# Patient Record
Sex: FEMALE | Race: WHITE | NOT HISPANIC OR LATINO | URBAN - METROPOLITAN AREA
[De-identification: names, ages, dates, MRNs, and addresses within clinical notes are randomized per-mention and may not be internally consistent; named-entity substitution may affect disease eponyms.]

---

## 2021-08-13 ENCOUNTER — INPATIENT (INPATIENT)
Facility: HOSPITAL | Age: 61
LOS: 6 days | Discharge: NURSING, REHAB OR LONG-TERM CARE | DRG: 951 | End: 2021-08-20
Attending: STUDENT IN AN ORGANIZED HEALTH CARE EDUCATION/TRAINING PROGRAM | Admitting: STUDENT IN AN ORGANIZED HEALTH CARE EDUCATION/TRAINING PROGRAM
Payer: MEDICAID

## 2021-08-13 VITALS
HEIGHT: 67 IN | HEART RATE: 54 BPM | OXYGEN SATURATION: 93 % | WEIGHT: 160.06 LBS | RESPIRATION RATE: 18 BRPM | DIASTOLIC BLOOD PRESSURE: 76 MMHG | SYSTOLIC BLOOD PRESSURE: 117 MMHG | TEMPERATURE: 98 F

## 2021-08-13 DIAGNOSIS — G35 MULTIPLE SCLEROSIS: ICD-10-CM

## 2021-08-13 DIAGNOSIS — Z29.9 ENCOUNTER FOR PROPHYLACTIC MEASURES, UNSPECIFIED: ICD-10-CM

## 2021-08-13 DIAGNOSIS — E03.9 HYPOTHYROIDISM, UNSPECIFIED: ICD-10-CM

## 2021-08-13 DIAGNOSIS — R06.00 DYSPNEA, UNSPECIFIED: ICD-10-CM

## 2021-08-13 DIAGNOSIS — F32.9 MAJOR DEPRESSIVE DISORDER, SINGLE EPISODE, UNSPECIFIED: ICD-10-CM

## 2021-08-13 LAB
ANION GAP SERPL CALC-SCNC: 8 MMOL/L — SIGNIFICANT CHANGE UP (ref 5–17)
BASOPHILS # BLD AUTO: 0.03 K/UL — SIGNIFICANT CHANGE UP (ref 0–0.2)
BASOPHILS NFR BLD AUTO: 0.6 % — SIGNIFICANT CHANGE UP (ref 0–2)
BUN SERPL-MCNC: 12 MG/DL — SIGNIFICANT CHANGE UP (ref 7–18)
CALCIUM SERPL-MCNC: 9.6 MG/DL — SIGNIFICANT CHANGE UP (ref 8.4–10.5)
CHLORIDE SERPL-SCNC: 100 MMOL/L — SIGNIFICANT CHANGE UP (ref 96–108)
CO2 SERPL-SCNC: 27 MMOL/L — SIGNIFICANT CHANGE UP (ref 22–31)
CREAT SERPL-MCNC: 0.58 MG/DL — SIGNIFICANT CHANGE UP (ref 0.5–1.3)
EOSINOPHIL # BLD AUTO: 0.15 K/UL — SIGNIFICANT CHANGE UP (ref 0–0.5)
EOSINOPHIL NFR BLD AUTO: 2.8 % — SIGNIFICANT CHANGE UP (ref 0–6)
GLUCOSE SERPL-MCNC: 94 MG/DL — SIGNIFICANT CHANGE UP (ref 70–99)
HCT VFR BLD CALC: 38 % — SIGNIFICANT CHANGE UP (ref 34.5–45)
HGB BLD-MCNC: 12.3 G/DL — SIGNIFICANT CHANGE UP (ref 11.5–15.5)
IMM GRANULOCYTES NFR BLD AUTO: 0.4 % — SIGNIFICANT CHANGE UP (ref 0–1.5)
LYMPHOCYTES # BLD AUTO: 0.96 K/UL — LOW (ref 1–3.3)
LYMPHOCYTES # BLD AUTO: 17.9 % — SIGNIFICANT CHANGE UP (ref 13–44)
MCHC RBC-ENTMCNC: 28.2 PG — SIGNIFICANT CHANGE UP (ref 27–34)
MCHC RBC-ENTMCNC: 32.4 GM/DL — SIGNIFICANT CHANGE UP (ref 32–36)
MCV RBC AUTO: 87.2 FL — SIGNIFICANT CHANGE UP (ref 80–100)
MONOCYTES # BLD AUTO: 0.36 K/UL — SIGNIFICANT CHANGE UP (ref 0–0.9)
MONOCYTES NFR BLD AUTO: 6.7 % — SIGNIFICANT CHANGE UP (ref 2–14)
NEUTROPHILS # BLD AUTO: 3.85 K/UL — SIGNIFICANT CHANGE UP (ref 1.8–7.4)
NEUTROPHILS NFR BLD AUTO: 71.6 % — SIGNIFICANT CHANGE UP (ref 43–77)
NRBC # BLD: 0 /100 WBCS — SIGNIFICANT CHANGE UP (ref 0–0)
PLATELET # BLD AUTO: 360 K/UL — SIGNIFICANT CHANGE UP (ref 150–400)
POTASSIUM SERPL-MCNC: 4 MMOL/L — SIGNIFICANT CHANGE UP (ref 3.5–5.3)
POTASSIUM SERPL-SCNC: 4 MMOL/L — SIGNIFICANT CHANGE UP (ref 3.5–5.3)
RBC # BLD: 4.36 M/UL — SIGNIFICANT CHANGE UP (ref 3.8–5.2)
RBC # FLD: 13.2 % — SIGNIFICANT CHANGE UP (ref 10.3–14.5)
SARS-COV-2 RNA SPEC QL NAA+PROBE: SIGNIFICANT CHANGE UP
SODIUM SERPL-SCNC: 135 MMOL/L — SIGNIFICANT CHANGE UP (ref 135–145)
WBC # BLD: 5.37 K/UL — SIGNIFICANT CHANGE UP (ref 3.8–10.5)
WBC # FLD AUTO: 5.37 K/UL — SIGNIFICANT CHANGE UP (ref 3.8–10.5)

## 2021-08-13 PROCEDURE — 71045 X-RAY EXAM CHEST 1 VIEW: CPT | Mod: 26

## 2021-08-13 PROCEDURE — 99222 1ST HOSP IP/OBS MODERATE 55: CPT | Mod: GC

## 2021-08-13 PROCEDURE — 99285 EMERGENCY DEPT VISIT HI MDM: CPT

## 2021-08-13 PROCEDURE — 93010 ELECTROCARDIOGRAM REPORT: CPT

## 2021-08-13 RX ORDER — FLUTICASONE PROPIONATE 50 MCG
1 SPRAY, SUSPENSION NASAL
Qty: 0 | Refills: 0 | DISCHARGE

## 2021-08-13 RX ORDER — CARBAMAZEPINE 200 MG
1 TABLET ORAL
Qty: 0 | Refills: 0 | DISCHARGE

## 2021-08-13 RX ORDER — BUPROPION HYDROCHLORIDE 150 MG/1
150 TABLET, EXTENDED RELEASE ORAL ONCE
Refills: 0 | Status: COMPLETED | OUTPATIENT
Start: 2021-08-13 | End: 2021-08-13

## 2021-08-13 RX ORDER — DALFAMPRIDINE 10 MG/1
1 TABLET, FILM COATED, EXTENDED RELEASE ORAL
Qty: 0 | Refills: 0 | DISCHARGE

## 2021-08-13 RX ORDER — ASPIRIN/CALCIUM CARB/MAGNESIUM 324 MG
1 TABLET ORAL
Qty: 0 | Refills: 0 | DISCHARGE

## 2021-08-13 RX ORDER — ONDANSETRON 8 MG/1
4 TABLET, FILM COATED ORAL EVERY 8 HOURS
Refills: 0 | Status: DISCONTINUED | OUTPATIENT
Start: 2021-08-13 | End: 2021-08-20

## 2021-08-13 RX ORDER — CARBAMAZEPINE 200 MG
200 TABLET ORAL
Refills: 0 | Status: DISCONTINUED | OUTPATIENT
Start: 2021-08-13 | End: 2021-08-13

## 2021-08-13 RX ORDER — BACLOFEN 100 %
1 POWDER (GRAM) MISCELLANEOUS
Qty: 0 | Refills: 0 | DISCHARGE

## 2021-08-13 RX ORDER — SIMVASTATIN 20 MG/1
1 TABLET, FILM COATED ORAL
Qty: 0 | Refills: 0 | DISCHARGE

## 2021-08-13 RX ORDER — LEVOTHYROXINE SODIUM 125 MCG
1 TABLET ORAL
Qty: 0 | Refills: 0 | DISCHARGE

## 2021-08-13 RX ORDER — FAMOTIDINE 10 MG/ML
1 INJECTION INTRAVENOUS
Qty: 0 | Refills: 0 | DISCHARGE

## 2021-08-13 RX ORDER — TRAMADOL HYDROCHLORIDE 50 MG/1
50 TABLET ORAL EVERY 8 HOURS
Refills: 0 | Status: DISCONTINUED | OUTPATIENT
Start: 2021-08-13 | End: 2021-08-18

## 2021-08-13 RX ORDER — LEVOTHYROXINE SODIUM 125 MCG
100 TABLET ORAL ONCE
Refills: 0 | Status: COMPLETED | OUTPATIENT
Start: 2021-08-13 | End: 2021-08-13

## 2021-08-13 RX ORDER — ENOXAPARIN SODIUM 100 MG/ML
40 INJECTION SUBCUTANEOUS DAILY
Refills: 0 | Status: DISCONTINUED | OUTPATIENT
Start: 2021-08-13 | End: 2021-08-20

## 2021-08-13 RX ORDER — FAMOTIDINE 10 MG/ML
20 INJECTION INTRAVENOUS ONCE
Refills: 0 | Status: COMPLETED | OUTPATIENT
Start: 2021-08-13 | End: 2021-08-13

## 2021-08-13 RX ORDER — BUPROPION HYDROCHLORIDE 150 MG/1
1 TABLET, EXTENDED RELEASE ORAL
Qty: 0 | Refills: 0 | DISCHARGE

## 2021-08-13 RX ORDER — ACETAMINOPHEN 500 MG
1 TABLET ORAL
Qty: 0 | Refills: 0 | DISCHARGE

## 2021-08-13 RX ORDER — ACETAMINOPHEN 500 MG
650 TABLET ORAL ONCE
Refills: 0 | Status: COMPLETED | OUTPATIENT
Start: 2021-08-13 | End: 2021-08-13

## 2021-08-13 RX ORDER — FAMOTIDINE 10 MG/ML
20 INJECTION INTRAVENOUS
Refills: 0 | Status: DISCONTINUED | OUTPATIENT
Start: 2021-08-13 | End: 2021-08-20

## 2021-08-13 RX ORDER — TRAMADOL HYDROCHLORIDE 50 MG/1
150 TABLET ORAL DAILY
Refills: 0 | Status: DISCONTINUED | OUTPATIENT
Start: 2021-08-13 | End: 2021-08-13

## 2021-08-13 RX ORDER — ACETAMINOPHEN 500 MG
325 TABLET ORAL EVERY 8 HOURS
Refills: 0 | Status: DISCONTINUED | OUTPATIENT
Start: 2021-08-13 | End: 2021-08-20

## 2021-08-13 RX ORDER — ASPIRIN/CALCIUM CARB/MAGNESIUM 324 MG
81 TABLET ORAL DAILY
Refills: 0 | Status: DISCONTINUED | OUTPATIENT
Start: 2021-08-14 | End: 2021-08-20

## 2021-08-13 RX ORDER — BUPROPION HYDROCHLORIDE 150 MG/1
150 TABLET, EXTENDED RELEASE ORAL DAILY
Refills: 0 | Status: DISCONTINUED | OUTPATIENT
Start: 2021-08-14 | End: 2021-08-20

## 2021-08-13 RX ORDER — LEVOTHYROXINE SODIUM 125 MCG
100 TABLET ORAL DAILY
Refills: 0 | Status: DISCONTINUED | OUTPATIENT
Start: 2021-08-14 | End: 2021-08-20

## 2021-08-13 RX ORDER — BACLOFEN 100 %
20 POWDER (GRAM) MISCELLANEOUS ONCE
Refills: 0 | Status: COMPLETED | OUTPATIENT
Start: 2021-08-13 | End: 2021-08-13

## 2021-08-13 RX ORDER — CARBAMAZEPINE 200 MG
200 TABLET ORAL ONCE
Refills: 0 | Status: COMPLETED | OUTPATIENT
Start: 2021-08-13 | End: 2021-08-13

## 2021-08-13 RX ORDER — ONDANSETRON 8 MG/1
1 TABLET, FILM COATED ORAL
Qty: 0 | Refills: 0 | DISCHARGE

## 2021-08-13 RX ORDER — SIMVASTATIN 20 MG/1
20 TABLET, FILM COATED ORAL AT BEDTIME
Refills: 0 | Status: DISCONTINUED | OUTPATIENT
Start: 2021-08-13 | End: 2021-08-20

## 2021-08-13 RX ORDER — BACLOFEN 100 %
20 POWDER (GRAM) MISCELLANEOUS THREE TIMES A DAY
Refills: 0 | Status: DISCONTINUED | OUTPATIENT
Start: 2021-08-13 | End: 2021-08-20

## 2021-08-13 RX ORDER — ASPIRIN/CALCIUM CARB/MAGNESIUM 324 MG
81 TABLET ORAL ONCE
Refills: 0 | Status: COMPLETED | OUTPATIENT
Start: 2021-08-13 | End: 2021-08-13

## 2021-08-13 RX ORDER — CARBAMAZEPINE 200 MG
200 TABLET ORAL
Refills: 0 | Status: DISCONTINUED | OUTPATIENT
Start: 2021-08-13 | End: 2021-08-20

## 2021-08-13 RX ORDER — TRAMADOL HYDROCHLORIDE 50 MG/1
1 TABLET ORAL
Qty: 0 | Refills: 0 | DISCHARGE

## 2021-08-13 RX ADMIN — Medication 650 MILLIGRAM(S): at 11:18

## 2021-08-13 RX ADMIN — Medication 20 MILLIGRAM(S): at 22:11

## 2021-08-13 RX ADMIN — BUPROPION HYDROCHLORIDE 150 MILLIGRAM(S): 150 TABLET, EXTENDED RELEASE ORAL at 11:19

## 2021-08-13 RX ADMIN — SIMVASTATIN 20 MILLIGRAM(S): 20 TABLET, FILM COATED ORAL at 22:12

## 2021-08-13 RX ADMIN — ENOXAPARIN SODIUM 40 MILLIGRAM(S): 100 INJECTION SUBCUTANEOUS at 13:10

## 2021-08-13 RX ADMIN — Medication 650 MILLIGRAM(S): at 11:44

## 2021-08-13 RX ADMIN — Medication 325 MILLIGRAM(S): at 22:11

## 2021-08-13 RX ADMIN — Medication 20 MILLIGRAM(S): at 11:19

## 2021-08-13 RX ADMIN — Medication 325 MILLIGRAM(S): at 22:41

## 2021-08-13 RX ADMIN — Medication 200 MILLIGRAM(S): at 19:03

## 2021-08-13 RX ADMIN — FAMOTIDINE 20 MILLIGRAM(S): 10 INJECTION INTRAVENOUS at 11:18

## 2021-08-13 RX ADMIN — Medication 20 MILLIGRAM(S): at 15:46

## 2021-08-13 RX ADMIN — Medication 200 MILLIGRAM(S): at 11:19

## 2021-08-13 RX ADMIN — FAMOTIDINE 20 MILLIGRAM(S): 10 INJECTION INTRAVENOUS at 19:04

## 2021-08-13 RX ADMIN — Medication 81 MILLIGRAM(S): at 11:18

## 2021-08-13 RX ADMIN — Medication 100 MICROGRAM(S): at 11:18

## 2021-08-13 NOTE — PHYSICAL THERAPY INITIAL EVALUATION ADULT - GENERAL OBSERVATIONS, REHAB EVAL
Patient received in supine, Axox3, presents w/ B/L foot drop, weakness to both UE and LE; (+) access on LUE.

## 2021-08-13 NOTE — ED PROVIDER NOTE - PROGRESS NOTE DETAILS
Spoke with Ms. Estrada from [place]. States air conditioning in patient's room broke. Now repaired and requesting to have patient returned to the facility. Patient states she refuses to return to [place] because "it's a nightmare." Has been in [place] for less than 24 hours. Before that, she was at [facility] for treatment of a UTI and was sent to [place] for rehab. Normally lives at home with  and son. Has 67 hours of HHA per week. Will contact case management. Medical director of [facility] called, patient declined to speak to him. Case management unable to arrange placement for today. Will admit. Endorsed to Dr. Mckay. Patient is resting comfortably, NAD. Asymptomatic.

## 2021-08-13 NOTE — H&P ADULT - PROBLEM SELECTOR PLAN 1
Pt called 911 due to feeling short of breath due to faulty air conditioning at [facility] yesterday, says she is sensitive to temperature secondary to MS  - Pt called 911 due to feeling short of breath due to faulty air conditioning at [facility] yesterday, says she is sensitive to temperature secondary to MS  -Case management working with patient for safe discharge planning Pt called 911 due to feeling short of breath due to faulty air conditioning at [facility] yesterday, says she is sensitive to temperature secondary to MS  -Case management working with patient for safe discharge planning  -Pt takes Dalfampridine ER 10 mg twice daily, Proanthocyanidins 36 mg x once daily  Also takes Tramadol PRN and Tylenol PRN for pain  C/w home medication  Pt has her home medications  F/u PT consult, pt uses a wheelchair to ambulate due to progressive worsening in ambulation 2/2 MS

## 2021-08-13 NOTE — PHYSICAL THERAPY INITIAL EVALUATION ADULT - CRITERIA FOR SKILLED THERAPEUTIC INTERVENTIONS
impairments found/functional limitations in following categories/risk reduction/prevention/rehab potential/predicted duration of therapy intervention

## 2021-08-13 NOTE — ED PROVIDER NOTE - OBJECTIVE STATEMENT
Patient states her room was very hot overnight, and she felt short of breath. No fever, cp, ap, n/v/d. Patient states her room was very hot overnight, and she felt short of breath. No fever, cp, ap, n/v/d. Patient called 911 herself because she said NH staff would not call.

## 2021-08-13 NOTE — PHYSICAL THERAPY INITIAL EVALUATION ADULT - ADDITIONAL COMMENTS
Patient states has WC and RW; has bedside commode at home. States had PT and HHA services.
Patient/Caregiver provided printed discharge information.

## 2021-08-13 NOTE — ED ADULT NURSE NOTE - NSIMPLEMENTINTERV_GEN_ALL_ED
Implemented All Fall Risk Interventions:  [place] to call system. Call bell, personal items and telephone within reach. Instruct patient to call for assistance. Room bathroom lighting operational. Non-slip footwear when patient is off stretcher. Physically safe environment: no spills, clutter or unnecessary equipment. Stretcher in lowest position, wheels locked, appropriate side rails in place. Provide visual cue, wrist band, yellow gown, etc. Monitor gait and stability. Monitor for mental status changes and reorient to person, place, and time. Review medications for side effects contributing to fall risk. Reinforce activity limits and safety measures with patient and family.

## 2021-08-13 NOTE — PHYSICAL THERAPY INITIAL EVALUATION ADULT - PERTINENT HX OF CURRENT PROBLEM, REHAB EVAL
Patient admitted from home due to SOB; patient w/ h/o Progressive MS, Hypothyroidism, Major Depression.

## 2021-08-13 NOTE — ED ADULT TRIAGE NOTE - CHIEF COMPLAINT QUOTE
Patient states " I feel short of breath. The room where I am at is hot and I can't stand hot and cold because I have multiple sclerosis "

## 2021-08-13 NOTE — ED PROVIDER NOTE - CLINICAL SUMMARY MEDICAL DECISION MAKING FREE TEXT BOX
Patient with shortness of breath that resolved, possibly due to hot environment. Will check labs, EKG, CXR. Now needs placement in new rehab facility.

## 2021-08-13 NOTE — H&P ADULT - ATTENDING COMMENTS
Patient seen and examined on bedside.    Vital Signs Last 24 Hrs  T(C): 36.6 (13 Aug 2021 06:55), Max: 36.6 (13 Aug 2021 06:55)  T(F): 97.8 (13 Aug 2021 06:55), Max: 97.8 (13 Aug 2021 06:55)  HR: 54 (13 Aug 2021 06:55) (54 - 54)  BP: 117/76 (13 Aug 2021 06:55) (117/76 - 117/76)  BP(mean): --  RR: 18 (13 Aug 2021 06:55) (18 - 18)  SpO2: 93% (13 Aug 2021 06:55) (93% - 93%)    Labs and imaging studies noted.    Assessment and plan: Patient seen and examined on bedside.    Vital Signs Last 24 Hrs  T(C): 36.6 (13 Aug 2021 06:55), Max: 36.6 (13 Aug 2021 06:55)  T(F): 97.8 (13 Aug 2021 06:55), Max: 97.8 (13 Aug 2021 06:55)  HR: 54 (13 Aug 2021 06:55) (54 - 54)  BP: 117/76 (13 Aug 2021 06:55) (117/76 - 117/76)  BP(mean): --  RR: 18 (13 Aug 2021 06:55) (18 - 18)  SpO2: 93% (13 Aug 2021 06:55) (93% - 93%)    Labs and imaging studies noted.    Assessment and plan:    60 y/o Female, ambulates with wheelchair, with medical hx significant for secondary to Progressive Multiple Sclerosis, Hypothyroidism, Major Depression, presenting to the ED due to shortness of breath. Pt says that she was discharged from [facility] yesterday, was admitted for UTI and discharged to [facility] for Rehab. Patient seen and examined on bedside.    Vital Signs Last 24 Hrs  T(C): 36.6 (13 Aug 2021 06:55), Max: 36.6 (13 Aug 2021 06:55)  T(F): 97.8 (13 Aug 2021 06:55), Max: 97.8 (13 Aug 2021 06:55)  HR: 54 (13 Aug 2021 06:55) (54 - 54)  BP: 117/76 (13 Aug 2021 06:55) (117/76 - 117/76)  BP(mean): --  RR: 18 (13 Aug 2021 06:55) (18 - 18)  SpO2: 93% (13 Aug 2021 06:55) (93% - 93%)    Labs and imaging studies noted.    Assessment and plan:    62 y/o Female, ambulates with wheelchair, with medical hx significant for secondary to Progressive Multiple Sclerosis, Hypothyroidism, Major Depression, presenting to the ED due to shortness of breath. Pt says that she was discharged from [facility] yesterday, was admitted for UTI and discharged to [facility] for Rehab. Patient essentially stayed at [facility] for 1 day and called 911 as she was feeling warm and AC conditioning not working causing her shortness of breath. Patient comfortable NAD, CXR clear. Patient reports that she do not want to go back to [facility] and would like to discuss other options for rehab. Patient reports no dysuria, has already completed course of antibiotics for UTI.    Progressive Multiple Sclerosis  Hypothyroidism.  Major Depression,    Plan:  Case management consult  PT eval  continue home medications. Patient seen and examined on bedside.    Vital Signs Last 24 Hrs  T(C): 36.6 (13 Aug 2021 06:55), Max: 36.6 (13 Aug 2021 06:55)  T(F): 97.8 (13 Aug 2021 06:55), Max: 97.8 (13 Aug 2021 06:55)  HR: 54 (13 Aug 2021 06:55) (54 - 54)  BP: 117/76 (13 Aug 2021 06:55) (117/76 - 117/76)  BP(mean): --  RR: 18 (13 Aug 2021 06:55) (18 - 18)  SpO2: 93% (13 Aug 2021 06:55) (93% - 93%)    Labs and imaging studies noted.    Assessment and plan:    60 y/o Female, ambulates with wheelchair, with medical hx significant for secondary to Progressive Multiple Sclerosis, Hypothyroidism, Major Depression, presenting to the ED due to shortness of breath. Pt says that she was discharged from [facility] yesterday, was admitted for UTI and discharged to [facility] for Rehab. Patient essentially stayed at [facility] for 1 day and called 911 as she was feeling warm and AC conditioning not working causing her shortness of breath. Patient comfortable NAD, CXR clear. Patient reports that she do not want to go back to [facility] and would like to discuss other options for rehab. Patient reports no dysuria, has already completed course of antibiotics for UTI.  Patient admitted for safe discharge planning.    Progressive Multiple Sclerosis  Hypothyroidism.  Major Depression  Discharge planning issues.    Plan:  Case management consult  PT eval  continue home medications.

## 2021-08-13 NOTE — H&P ADULT - PROBLEM SELECTOR PLAN 2
Pt has hx of Multiple Sclerosis, takes Dalfampridine ER 10 mg twice daily, Proanthocyanidins 36 mg x 1 day  C/w home medication  Pt has her home medications Pt has hx of Multiple Sclerosis, takes Dalfampridine ER 10 mg twice daily, Proanthocyanidins 36 mg x 1 day  Also takes Tramadol PRN and Tylenol PRN for pain  C/w home medication  Pt has her home medications  F/u PT consult, pt uses a wheelchair to ambulate due to progressive worsening in ambulation 2/2 MS Pt has hx of Levothyroxine 100 mg   C/w home medication

## 2021-08-13 NOTE — H&P ADULT - NSHPPHYSICALEXAM_GEN_ALL_CORE
PHYSICAL EXAM:  GENERAL: Elderly female resting in bed, upright and comfortable  HEAD:  Atraumatic, Normocephalic  EYES: EOMI, PERRLA, conjunctiva and sclera clear  NECK: Supple, No JVD  CHEST/LUNG: Clear to auscultation bilaterally; No wheeze  HEART: Regular rate and rhythm; S1, S2+  ABDOMEN: Soft, Nontender, Nondistended; Bowel sounds present  EXTREMITIES: No clubbing, cyanosis, or edema  NEUROLOGY: AAOx3, 3/5 pt minimally resists to gravity  SKIN: No rashes or lesions

## 2021-08-13 NOTE — H&P ADULT - NSHPOUTPATIENTPROVIDERS_GEN_ALL_CORE
Fredrick Chapman MD -[phone], [facility] and [facility]

## 2021-08-13 NOTE — ED ADULT NURSE NOTE - OBJECTIVE STATEMENT
pt is here for shortness of breath.  pt stated that "I cannot breath", sending from 24 hours nursing home, finish her talking without difficulty, h/x MS, a/ox3, denied chest pain, denied fever or chills, no distress noted at this time.

## 2021-08-13 NOTE — H&P ADULT - ASSESSMENT
62 y/o Female, ambulates with wheelchair, with medical hx significant for Secondary Progressive Multiple Sclerosis, Hypothyroidism, Major Depression, presenting to the ED due to shortness of breath 62 y/o Female, ambulates with wheelchair, with medical hx significant for Secondary Progressive Multiple Sclerosis, Hypothyroidism, Major Depression, presenting to the ED due to shortness of breath which has resolved upon hospital admission, admitted for Safe discharge.

## 2021-08-13 NOTE — H&P ADULT - PROBLEM SELECTOR PLAN 3
Pt has hx of Levothyroxine 100 mg   C/w home medication Pt has hx of Depression, takes Wellbutrin XL daily  C/w home medication

## 2021-08-13 NOTE — H&P ADULT - HISTORY OF PRESENT ILLNESS
Patient is a 62 y/o Female, ambulates with wheelchair, with medical hx significant for Secondary Progressive Multiple Sclerosis, Hypothyroidism, Major Depression, presenting to the ED due to shortness of breath. Pt says that she was discharged from [facility] yesterday, was admitted for UTI and discharged to [facility] for Rehab. She endorses that she is very sensitive to temperature changes due to underlying MS and yesterday, the air conditioning was not working which caused her to feel short of breathe, fatigued, nauseous and had a headache hence she called 911 and brought all her belongings with the EMR. Pt currently very particular that she would not like to return to [place], would like to be placed elsewhere upon discharge.   Pt has residual weakness bilateral lower extremities for quite some time    Patient is a 60 y/o Female, ambulates with wheelchair, with medical hx significant for Secondary Progressive Multiple Sclerosis, Hypothyroidism, Major Depression, presenting to the ED due to shortness of breath. Pt says that she was discharged from [facility] yesterday, was admitted for UTI and discharged to [facility] for Rehab. She endorses that she is very sensitive to temperature changes due to underlying MS and yesterday, the air conditioning was not working which caused her to feel short of breathe, fatigued, nauseous and had a headache hence she called 911 and brought all her belongings with the EMR. Pt currently very particular that she would not like to return to [place], would like to be placed elsewhere upon discharge.   Pt has residual weakness bilateral lower extremities ever since she was diagnosed with MS. 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

## 2021-08-14 PROCEDURE — 99232 SBSQ HOSP IP/OBS MODERATE 35: CPT

## 2021-08-14 RX ORDER — SODIUM CHLORIDE 9 MG/ML
500 INJECTION INTRAMUSCULAR; INTRAVENOUS; SUBCUTANEOUS ONCE
Refills: 0 | Status: COMPLETED | OUTPATIENT
Start: 2021-08-14 | End: 2021-08-14

## 2021-08-14 RX ADMIN — Medication 81 MILLIGRAM(S): at 11:10

## 2021-08-14 RX ADMIN — Medication 200 MILLIGRAM(S): at 17:38

## 2021-08-14 RX ADMIN — Medication 100 MICROGRAM(S): at 05:07

## 2021-08-14 RX ADMIN — Medication 200 MILLIGRAM(S): at 06:29

## 2021-08-14 RX ADMIN — BUPROPION HYDROCHLORIDE 150 MILLIGRAM(S): 150 TABLET, EXTENDED RELEASE ORAL at 11:10

## 2021-08-14 RX ADMIN — SIMVASTATIN 20 MILLIGRAM(S): 20 TABLET, FILM COATED ORAL at 22:32

## 2021-08-14 RX ADMIN — FAMOTIDINE 20 MILLIGRAM(S): 10 INJECTION INTRAVENOUS at 17:39

## 2021-08-14 RX ADMIN — Medication 20 MILLIGRAM(S): at 05:06

## 2021-08-14 RX ADMIN — ENOXAPARIN SODIUM 40 MILLIGRAM(S): 100 INJECTION SUBCUTANEOUS at 11:10

## 2021-08-14 RX ADMIN — Medication 20 MILLIGRAM(S): at 22:32

## 2021-08-14 RX ADMIN — Medication 20 MILLIGRAM(S): at 17:39

## 2021-08-14 RX ADMIN — FAMOTIDINE 20 MILLIGRAM(S): 10 INJECTION INTRAVENOUS at 05:06

## 2021-08-14 NOTE — CONSULT NOTE ADULT - SUBJECTIVE AND OBJECTIVE BOX
Time of visit:    CHIEF COMPLAINT: Patient is a 61y old  Female who presents with a chief complaint of Shortness of breath (14 Aug 2021 11:55)      HPI:  Patient is a 60 y/o Female, ambulates with wheelchair, with medical hx significant for Secondary Progressive Multiple Sclerosis, Hypothyroidism, Major Depression, presenting to the ED due to shortness of breath. Pt says that she was discharged from [facility] yesterday, was admitted for UTI and discharged to [facility] for Rehab. She endorses that she is very sensitive to temperature changes due to underlying MS and yesterday, the air conditioning was not working which caused her to feel short of breathe, fatigued, nauseous and had a headache hence she called 911 and brought all her belongings with the EMR. Pt currently very particular that she would not like to return to [place], would like to be placed elsewhere upon discharge.   Pt has residual weakness bilateral lower extremities ever since she was diagnosed with MS. 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   (13 Aug 2021 11:29)   Patient seen and examined.     PAST MEDICAL & SURGICAL HISTORY:  Multiple sclerosis    Hypothyroidism        Allergies    Bactrim (Short breath)  Vicodin (Short breath; Rash)    Intolerances        MEDICATIONS  (STANDING):  aspirin enteric coated 81 milliGRAM(s) Oral daily  baclofen 20 milliGRAM(s) Oral three times a day  buPROPion XL (24-Hour) . 150 milliGRAM(s) Oral daily  carBAMazepine ER Tablet 200 milliGRAM(s) Oral two times a day  enoxaparin Injectable 40 milliGRAM(s) SubCutaneous daily  famotidine    Tablet 20 milliGRAM(s) Oral two times a day  levothyroxine 100 MICROGram(s) Oral daily  simvastatin 20 milliGRAM(s) Oral at bedtime  traMADol 50 milliGRAM(s) Oral every 8 hours      MEDICATIONS  (PRN):  acetaminophen   Tablet .. 325 milliGRAM(s) Oral every 8 hours PRN Mild Pain (1 - 3)  ondansetron    Tablet 4 milliGRAM(s) Oral every 8 hours PRN Nausea and/or Vomiting   Medications up to date at time of exam.    Medications up to date at time of exam.    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    CARDIOVASCULAR:  denies pressure, squeezing, tightness, or heaviness about the chest; no palpitations.    RESPIRATORY:  denies SOB, cough, NOE, wheezing.    GASTROINTESTINAL:  denies abdominal pain, nausea, vomiting or diarrhea.    GENITOURINARY: denies dysuria, frequency or urgency.    NEUROLOGIC:  denies numbness, tingling, seizures or weakness.    PSYCHIATRIC:  denies disorder of thought or mood.    MSK: denies swelling, redness      PHYSICAL EXAMINATION:    GENERAL: The patient is a well-developed, well-nourished, in no apparent distress.     Vital Signs Last 24 Hrs  T(C): 36.9 (14 Aug 2021 12:56), Max: 36.9 (14 Aug 2021 12:56)  T(F): 98.5 (14 Aug 2021 12:56), Max: 98.5 (14 Aug 2021 12:56)  HR: 73 (14 Aug 2021 14:34) (58 - 73)  BP: 109/70 (14 Aug 2021 14:34) (97/65 - 118/73)  BP(mean): --  RR: 16 (14 Aug 2021 12:56) (16 - 18)  SpO2: 99% (14 Aug 2021 14:34) (95% - 99%)   (if applicable)    Chest Tube (if applicable)    HEENT: Head is normocephalic and atraumatic. Extraocular muscles are intact. Mucous membranes are moist.     NECK: Supple, no palpable adenopathy.    LUNGS: Clear to auscultation, no wheezing, rales, or rhonchi.    HEART: Regular rate and rhythm without murmur.    ABDOMEN: Soft, nontender, and nondistended.  No hepatosplenomegaly is noted.    RENAL: No difficulty voiding, no pelvic pain    EXTREMITIES: Without any cyanosis, clubbing, rash, lesions or edema.    NEUROLOGIC: Awake, alert, oriented, grossly intact  b/l lower weakness 2/5    SKIN: Warm, dry, good turgor.      LABS:                        12.3   5.37  )-----------( 360      ( 13 Aug 2021 08:37 )             38.0     08-13    135  |  100  |  12  ----------------------------<  94  4.0   |  27  |  0.58    Ca    9.6      13 Aug 2021 08:37                          MICROBIOLOGY: (if applicable)    RADIOLOGY & ADDITIONAL STUDIES:  EKG:   CXR:< from: Xray Chest 1 View- PORTABLE-Urgent (08.13.21 @ 08:53) >    EXAM:  XR CHEST PORTABLE URGENT 1V                            PROCEDURE DATE:  08/13/2021          INTERPRETATION:  AP erect chest on August 13, 2021 at 8:48 AM. Patient is short of breath.    COMPARISON: None available.    Heart size is within normal limits.    The lung fields and pleural surfaces are unremarkable.    IMPRESSION: Negative chest.    --- End of Report ---            MICHEL JOHNSON MD; Attending Radiologist  This document has been electronically signed. Aug 13 2021 11:37AM    < end of copied text >    ECHO:    IMPRESSION: 61y Female PAST MEDICAL & SURGICAL HISTORY:  Multiple sclerosis    Hypothyroidism     p/w           IMP: This is a 61 yr old white woman , ambulates with wheelchair, with medical hx significant for Secondary Progressive Multiple Sclerosis, Hypothyroidism, Major Depression, presenting to the ED due to shortness of breath. Pt says that she was discharged from [facility] yesterday, was admitted for UTI and discharged to [facility] for Rehab. She endorses that she is very sensitive to temperature changes due to underlying MS and yesterday, the air conditioning was not working which caused her to feel short of breathe, fatigued, nauseous and had a headache hence she called 911 and brought all her belongings with the EMR. Pt currently very particular that she would not like to return to [place], would like to be placed elsewhere upon discharge.   Pat is speaking with out difficulty , SOB resolved at time of examination       [place]ugg:  - continue meds   - PT   - asp precaution   - if pat becomes SOB/ resp distress , check VC   - DVT/ GI prophy

## 2021-08-14 NOTE — PROGRESS NOTE ADULT - SUBJECTIVE AND OBJECTIVE BOX
HPI:  Patient is a 60 y/o Female, ambulates with wheelchair, with medical hx significant for Secondary Progressive Multiple Sclerosis, Hypothyroidism, Major Depression, presenting to the ED due to shortness of breath. Pt says that she was discharged from [facility] yesterday, was admitted for UTI and discharged to [facility] for Rehab. She endorses that she is very sensitive to temperature changes due to underlying MS and yesterday, the air conditioning was not working which caused her to feel short of breathe, fatigued, nauseous and had a headache hence she called 911 and brought all her belongings with the EMR. Pt currently very particular that she would not like to return to [place], would like to be placed elsewhere upon discharge.   Pt has residual weakness bilateral lower extremities ever since she was diagnosed with MS. 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   (13 Aug 2021 11:29)      Patient is a 61y old  Female who presents with a chief complaint of Shortness of breath (13 Aug 2021 11:29)      INTERVAL HPI/OVERNIGHT EVENTS:  T(C): 36.4 (08-14-21 @ 05:00), Max: 36.7 (08-13-21 @ 16:12)  HR: 68 (08-14-21 @ 11:16) (54 - 68)  BP: 118/73 (08-14-21 @ 11:16) (97/65 - 118/73)  RR: 17 (08-14-21 @ 05:00) (17 - 18)  SpO2: 95% (08-14-21 @ 05:00) (95% - 97%)  Wt(kg): --  I&O's Summary    13 Aug 2021 07:01  -  14 Aug 2021 07:00  --------------------------------------------------------  IN: 0 mL / OUT: 350 mL / NET: -350 mL        REVIEW OF SYSTEMS: denies fever, chills, SOB, palpitations, chest pain, abdominal pain, nausea, vomitting, diarrhea, constipation, dizziness    MEDICATIONS  (STANDING):  aspirin enteric coated 81 milliGRAM(s) Oral daily  baclofen 20 milliGRAM(s) Oral three times a day  buPROPion XL (24-Hour) . 150 milliGRAM(s) Oral daily  carBAMazepine ER Tablet 200 milliGRAM(s) Oral two times a day  enoxaparin Injectable 40 milliGRAM(s) SubCutaneous daily  famotidine    Tablet 20 milliGRAM(s) Oral two times a day  levothyroxine 100 MICROGram(s) Oral daily  simvastatin 20 milliGRAM(s) Oral at bedtime  traMADol 50 milliGRAM(s) Oral every 8 hours    MEDICATIONS  (PRN):  acetaminophen   Tablet .. 325 milliGRAM(s) Oral every 8 hours PRN Mild Pain (1 - 3)  ondansetron    Tablet 4 milliGRAM(s) Oral every 8 hours PRN Nausea and/or Vomiting      PHYSICAL EXAM:  GENERAL: NAD, well-groomed, well-developed  HEAD:  Atraumatic, Normocephalic  EYES: EOMI, PERRLA, conjunctiva and sclera clear  ENMT: No tonsillar erythema, exudates, or enlargement; Moist mucous membranes, Good dentition, No lesions  NECK: Supple, No JVD, Normal thyroid  NERVOUS SYSTEM:  Alert & Oriented X3, Good concentration; Motor Strength 5/5 B/L upper and lower extremities; DTRs 2+ intact and symmetric  CHEST/LUNG: Clear to percussion bilaterally; No rales, rhonchi, wheezing, or rubs  HEART: Regular rate and rhythm; No murmurs, rubs, or gallops  ABDOMEN: Soft, Nontender, Nondistended; Bowel sounds present  EXTREMITIES:  2+ Peripheral Pulses, No clubbing, cyanosis, or edema  LYMPH: No lymphadenopathy noted  SKIN: No rashes or lesions  LABS:                        12.3   5.37  )-----------( 360      ( 13 Aug 2021 08:37 )             38.0     08-13    135  |  100  |  12  ----------------------------<  94  4.0   |  27  |  0.58    Ca    9.6      13 Aug 2021 08:37          CAPILLARY BLOOD GLUCOSE             HPI:  Patient is a 62 y/o Female, ambulates with wheelchair, with medical hx significant for Secondary Progressive Multiple Sclerosis, Hypothyroidism, Major Depression, presenting to the ED due to shortness of breath. Pt says that she was discharged from [facility] yesterday, was admitted for UTI and discharged to [facility] for Rehab. She endorses that she is very sensitive to temperature changes due to underlying MS and yesterday, the air conditioning was not working which caused her to feel short of breathe, fatigued, nauseous and had a headache hence she called 911 and brought all her belongings with the EMR. Pt currently very particular that she would not like to return to [place], would like to be placed elsewhere upon discharge.   Pt has residual weakness bilateral lower extremities ever since she was diagnosed with MS. 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   (13 Aug 2021 11:29)      Patient is a 61y old  Female who presents with a chief complaint of Shortness of breath (13 Aug 2021 11:29)      INTERVAL HPI/OVERNIGHT EVENTS: No  new events overnight   Patient working with PT.    T(C): 36.4 (08-14-21 @ 05:00), Max: 36.7 (08-13-21 @ 16:12)  HR: 68 (08-14-21 @ 11:16) (54 - 68)  BP: 118/73 (08-14-21 @ 11:16) (97/65 - 118/73)  RR: 17 (08-14-21 @ 05:00) (17 - 18)  SpO2: 95% (08-14-21 @ 05:00) (95% - 97%)  Wt(kg): --  I&O's Summary    13 Aug 2021 07:01  -  14 Aug 2021 07:00  --------------------------------------------------------  IN: 0 mL / OUT: 350 mL / NET: -350 mL        REVIEW OF SYSTEMS: denies fever, chills, SOB, palpitations, chest pain, abdominal pain, nausea, vomiting, diarrhea, constipation, dizziness    MEDICATIONS  (STANDING):  aspirin enteric coated 81 milliGRAM(s) Oral daily  baclofen 20 milliGRAM(s) Oral three times a day  buPROPion XL (24-Hour) . 150 milliGRAM(s) Oral daily  carBAMazepine ER Tablet 200 milliGRAM(s) Oral two times a day  enoxaparin Injectable 40 milliGRAM(s) SubCutaneous daily  famotidine    Tablet 20 milliGRAM(s) Oral two times a day  levothyroxine 100 MICROGram(s) Oral daily  simvastatin 20 milliGRAM(s) Oral at bedtime  traMADol 50 milliGRAM(s) Oral every 8 hours    MEDICATIONS  (PRN):  acetaminophen   Tablet .. 325 milliGRAM(s) Oral every 8 hours PRN Mild Pain (1 - 3)  ondansetron    Tablet 4 milliGRAM(s) Oral every 8 hours PRN Nausea and/or Vomiting      PHYSICAL EXAM:  GENERAL: NAD, well-groomed, well-developed  HEAD:  Atraumatic, Normocephalic  EYES: EOMI, PERRLA, conjunctiva and sclera clear  ENMT: No tonsillar erythema, exudates, or enlargement; Moist mucous membranes, Good dentition, No lesions  NECK: Supple, No JVD, Normal thyroid  NERVOUS SYSTEM:  Alert & Oriented X3, Good concentration; Motor Strength 5/5 B/L upper and lower extremities; DTRs 2+ intact and symmetric  CHEST/LUNG: Clear to percussion bilaterally; No rales, rhonchi, wheezing, or rubs  HEART: Regular rate and rhythm; No murmurs, rubs, or gallops  ABDOMEN: Soft, Nontender, Nondistended; Bowel sounds present  EXTREMITIES:  2+ Peripheral Pulses, No clubbing, cyanosis, or edema  LYMPH: No lymphadenopathy noted  SKIN: No rashes or lesions  LABS:                        12.3   5.37  )-----------( 360      ( 13 Aug 2021 08:37 )             38.0     08-13    135  |  100  |  12  ----------------------------<  94  4.0   |  27  |  0.58    Ca    9.6      13 Aug 2021 08:37          CAPILLARY BLOOD GLUCOSE

## 2021-08-15 PROCEDURE — 99232 SBSQ HOSP IP/OBS MODERATE 35: CPT

## 2021-08-15 RX ADMIN — Medication 200 MILLIGRAM(S): at 17:35

## 2021-08-15 RX ADMIN — Medication 81 MILLIGRAM(S): at 12:13

## 2021-08-15 RX ADMIN — FAMOTIDINE 20 MILLIGRAM(S): 10 INJECTION INTRAVENOUS at 05:31

## 2021-08-15 RX ADMIN — TRAMADOL HYDROCHLORIDE 50 MILLIGRAM(S): 50 TABLET ORAL at 15:10

## 2021-08-15 RX ADMIN — BUPROPION HYDROCHLORIDE 150 MILLIGRAM(S): 150 TABLET, EXTENDED RELEASE ORAL at 12:13

## 2021-08-15 RX ADMIN — Medication 325 MILLIGRAM(S): at 11:38

## 2021-08-15 RX ADMIN — Medication 20 MILLIGRAM(S): at 14:10

## 2021-08-15 RX ADMIN — SIMVASTATIN 20 MILLIGRAM(S): 20 TABLET, FILM COATED ORAL at 22:49

## 2021-08-15 RX ADMIN — Medication 100 MICROGRAM(S): at 05:31

## 2021-08-15 RX ADMIN — Medication 325 MILLIGRAM(S): at 10:28

## 2021-08-15 RX ADMIN — FAMOTIDINE 20 MILLIGRAM(S): 10 INJECTION INTRAVENOUS at 17:36

## 2021-08-15 RX ADMIN — Medication 20 MILLIGRAM(S): at 05:31

## 2021-08-15 RX ADMIN — Medication 20 MILLIGRAM(S): at 22:49

## 2021-08-15 RX ADMIN — ENOXAPARIN SODIUM 40 MILLIGRAM(S): 100 INJECTION SUBCUTANEOUS at 12:13

## 2021-08-15 RX ADMIN — TRAMADOL HYDROCHLORIDE 50 MILLIGRAM(S): 50 TABLET ORAL at 14:10

## 2021-08-15 RX ADMIN — Medication 200 MILLIGRAM(S): at 05:30

## 2021-08-15 NOTE — PROGRESS NOTE ADULT - SUBJECTIVE AND OBJECTIVE BOX
Time of Visit:  Patient seen and examined.     MEDICATIONS  (STANDING):  aspirin enteric coated 81 milliGRAM(s) Oral daily  baclofen 20 milliGRAM(s) Oral three times a day  buPROPion XL (24-Hour) . 150 milliGRAM(s) Oral daily  carBAMazepine ER Tablet 200 milliGRAM(s) Oral two times a day  enoxaparin Injectable 40 milliGRAM(s) SubCutaneous daily  famotidine    Tablet 20 milliGRAM(s) Oral two times a day  levothyroxine 100 MICROGram(s) Oral daily  simvastatin 20 milliGRAM(s) Oral at bedtime  traMADol 50 milliGRAM(s) Oral every 8 hours      MEDICATIONS  (PRN):  acetaminophen   Tablet .. 325 milliGRAM(s) Oral every 8 hours PRN Mild Pain (1 - 3)  ondansetron    Tablet 4 milliGRAM(s) Oral every 8 hours PRN Nausea and/or Vomiting       Medications up to date at time of exam.      PHYSICAL EXAMINATION:  Patient has no new complaints.  GENERAL: The patient is a well-developed, well-nourished, in no apparent distress.     Vital Signs Last 24 Hrs  T(C): 36.6 (15 Aug 2021 13:07), Max: 36.7 (14 Aug 2021 20:48)  T(F): 97.8 (15 Aug 2021 13:07), Max: 98 (14 Aug 2021 20:48)  HR: 64 (15 Aug 2021 13:07) (60 - 71)  BP: 100/68 (15 Aug 2021 13:07) (93/61 - 100/68)  BP(mean): --  RR: 16 (15 Aug 2021 13:07) (16 - 18)  SpO2: 98% (15 Aug 2021 13:07) (95% - 98%)   (if applicable)    Chest Tube (if applicable)    HEENT: Head is normocephalic and atraumatic. Extraocular muscles are intact. Mucous membranes are moist.     NECK: Supple, no palpable adenopathy.    LUNGS: Clear to auscultation, no wheezing, rales, or rhonchi.    HEART: Regular rate and rhythm without murmur.    ABDOMEN: Soft, nontender, and nondistended.  No hepatosplenomegaly is noted.    : No painful voiding, no pelvic pain    EXTREMITIES: Without any cyanosis, clubbing, rash, lesions or edema.    NEUROLOGIC: Awake, alert, oriented, grossly intact    SKIN: Warm, dry, good turgor.      LABS:                              MICROBIOLOGY: (if applicable)    RADIOLOGY & ADDITIONAL STUDIES:  EKG:   CXR:  ECHO:    IMPRESSION: 61y Female PAST MEDICAL & SURGICAL HISTORY:  Multiple sclerosis    Hypothyroidism     p/w           IMP: This is a 61 yr old white woman , ambulates with wheelchair, with medical hx significant for Secondary Progressive Multiple Sclerosis, Hypothyroidism, Major Depression, presenting to the ED due to shortness of breath. Pt says that she was discharged from [facility] yesterday, was admitted for UTI and discharged to [facility] for Rehab. She endorses that she is very sensitive to temperature changes due to underlying MS and yesterday, the air conditioning was not working which caused her to feel short of breathe, fatigued, nauseous and had a headache hence she called 911 and brought all her belongings with the EMR. Pt currently very particular that she would not like to return to [place], would like to be placed elsewhere upon discharge.   Pat is speaking with out difficulty , SOB resolved at time of examination       Sugg:  - continue meds   - PT   - asp precaution   - if pat becomes SOB/ resp distress , check VC   - DVT/ GI prophy

## 2021-08-15 NOTE — PROGRESS NOTE ADULT - SUBJECTIVE AND OBJECTIVE BOX
HPI:  Patient is a 62 y/o Female, ambulates with wheelchair, with medical hx significant for Secondary Progressive Multiple Sclerosis, Hypothyroidism, Major Depression, presenting to the ED due to shortness of breath. Pt says that she was discharged from [facility] yesterday, was admitted for UTI and discharged to [facility] for Rehab. She endorses that she is very sensitive to temperature changes due to underlying MS and yesterday, the air conditioning was not working which caused her to feel short of breathe, fatigued, nauseous and had a headache hence she called 911 and brought all her belongings with the EMR. Pt currently very particular that she would not like to return to [place], would like to be placed elsewhere upon discharge.   Pt has residual weakness bilateral lower extremities ever since she was diagnosed with MS. 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).   (13 Aug 2021 11:29)      Patient is a 61y old  Female who presents with a chief complaint of Shortness of breath (15 Aug 2021 15:45)      INTERVAL HPI/OVERNIGHT EVENTS:  T(C): 36.6 (08-15-21 @ 13:07), Max: 36.7 (08-14-21 @ 20:48)  HR: 64 (08-15-21 @ 13:07) (60 - 71)  BP: 100/68 (08-15-21 @ 13:07) (93/61 - 100/68)  RR: 16 (08-15-21 @ 13:07) (16 - 18)  SpO2: 98% (08-15-21 @ 13:07) (95% - 98%)  Wt(kg): --  I&O's Summary    14 Aug 2021 07:01  -  15 Aug 2021 07:00  --------------------------------------------------------  IN: 0 mL / OUT: 1850 mL / NET: -1850 mL    15 Aug 2021 07:01  -  15 Aug 2021 15:59  --------------------------------------------------------  IN: 0 mL / OUT: 500 mL / NET: -500 mL        REVIEW OF SYSTEMS: denies fever, chills, SOB, palpitations, chest pain, abdominal pain, nausea, vomitting, diarrhea, constipation, dizziness    MEDICATIONS  (STANDING):  aspirin enteric coated 81 milliGRAM(s) Oral daily  baclofen 20 milliGRAM(s) Oral three times a day  buPROPion XL (24-Hour) . 150 milliGRAM(s) Oral daily  carBAMazepine ER Tablet 200 milliGRAM(s) Oral two times a day  enoxaparin Injectable 40 milliGRAM(s) SubCutaneous daily  famotidine    Tablet 20 milliGRAM(s) Oral two times a day  levothyroxine 100 MICROGram(s) Oral daily  simvastatin 20 milliGRAM(s) Oral at bedtime  traMADol 50 milliGRAM(s) Oral every 8 hours    MEDICATIONS  (PRN):  acetaminophen   Tablet .. 325 milliGRAM(s) Oral every 8 hours PRN Mild Pain (1 - 3)  ondansetron    Tablet 4 milliGRAM(s) Oral every 8 hours PRN Nausea and/or Vomiting      PHYSICAL EXAM:  GENERAL: NAD, well-groomed, well-developed  HEAD:  Atraumatic, Normocephalic  EYES: EOMI, PERRLA, conjunctiva and sclera clear  ENMT: No tonsillar erythema, exudates, or enlargement; Moist mucous membranes  NECK: Supple, No JVD, Normal thyroid  NERVOUS SYSTEM:  Alert & Oriented X3, Good concentration; no neurological deficit  CHEST/LUNG: Clear to percussion bilaterally; No rales, rhonchi, wheezing, or rubs  HEART: Regular rate and rhythm; No murmurs, rubs, or gallops  ABDOMEN: Soft, Nontender, Nondistended; Bowel sounds present  EXTREMITIES:  2+ Peripheral Pulses, No clubbing, cyanosis, or edema  LYMPH: No lymphadenopathy noted  SKIN: No rashes or lesions  LABS:              CAPILLARY BLOOD GLUCOSE

## 2021-08-16 DIAGNOSIS — Z02.9 ENCOUNTER FOR ADMINISTRATIVE EXAMINATIONS, UNSPECIFIED: ICD-10-CM

## 2021-08-16 PROCEDURE — 99232 SBSQ HOSP IP/OBS MODERATE 35: CPT | Mod: GC

## 2021-08-16 RX ADMIN — SIMVASTATIN 20 MILLIGRAM(S): 20 TABLET, FILM COATED ORAL at 21:21

## 2021-08-16 RX ADMIN — BUPROPION HYDROCHLORIDE 150 MILLIGRAM(S): 150 TABLET, EXTENDED RELEASE ORAL at 13:33

## 2021-08-16 RX ADMIN — FAMOTIDINE 20 MILLIGRAM(S): 10 INJECTION INTRAVENOUS at 05:46

## 2021-08-16 RX ADMIN — FAMOTIDINE 20 MILLIGRAM(S): 10 INJECTION INTRAVENOUS at 18:08

## 2021-08-16 RX ADMIN — Medication 20 MILLIGRAM(S): at 05:46

## 2021-08-16 RX ADMIN — Medication 20 MILLIGRAM(S): at 13:34

## 2021-08-16 RX ADMIN — Medication 100 MICROGRAM(S): at 05:46

## 2021-08-16 RX ADMIN — ENOXAPARIN SODIUM 40 MILLIGRAM(S): 100 INJECTION SUBCUTANEOUS at 13:33

## 2021-08-16 RX ADMIN — Medication 20 MILLIGRAM(S): at 21:21

## 2021-08-16 RX ADMIN — Medication 200 MILLIGRAM(S): at 18:08

## 2021-08-16 RX ADMIN — Medication 81 MILLIGRAM(S): at 13:33

## 2021-08-16 RX ADMIN — Medication 200 MILLIGRAM(S): at 05:46

## 2021-08-16 RX ADMIN — Medication 325 MILLIGRAM(S): at 21:51

## 2021-08-16 RX ADMIN — Medication 325 MILLIGRAM(S): at 21:21

## 2021-08-16 NOTE — PROGRESS NOTE ADULT - SUBJECTIVE AND OBJECTIVE BOX
Time of Visit:  Patient seen and examined.     MEDICATIONS  (STANDING):  aspirin enteric coated 81 milliGRAM(s) Oral daily  baclofen 20 milliGRAM(s) Oral three times a day  buPROPion XL (24-Hour) . 150 milliGRAM(s) Oral daily  carBAMazepine ER Tablet 200 milliGRAM(s) Oral two times a day  enoxaparin Injectable 40 milliGRAM(s) SubCutaneous daily  famotidine    Tablet 20 milliGRAM(s) Oral two times a day  levothyroxine 100 MICROGram(s) Oral daily  simvastatin 20 milliGRAM(s) Oral at bedtime  traMADol 50 milliGRAM(s) Oral every 8 hours      MEDICATIONS  (PRN):  acetaminophen   Tablet .. 325 milliGRAM(s) Oral every 8 hours PRN Mild Pain (1 - 3)  ondansetron    Tablet 4 milliGRAM(s) Oral every 8 hours PRN Nausea and/or Vomiting       Medications up to date at time of exam.    ROS; No fever, chills, cough, congestion.   PHYSICAL EXAMINATION:    Vital Signs Last 24 Hrs  T(C): 36.2 (16 Aug 2021 05:25), Max: 36.6 (15 Aug 2021 20:30)  T(F): 97.2 (16 Aug 2021 05:25), Max: 97.8 (15 Aug 2021 20:30)  HR: 57 (16 Aug 2021 05:25) (57 - 64)  BP: 100/64 (16 Aug 2021 05:25) (100/62 - 100/64)  BP(mean): --  RR: 17 (16 Aug 2021 05:25) (17 - 18)  SpO2: 96% (16 Aug 2021 05:25) (95% - 96%)   (if applicable)    General; Alert and oriented. Able to answer question with no SOB. No acute distress.     HEENT: Head is normocephalic and atraumatic. No nasal tenderness. Extraocular muscles are intact. Mucous membranes are moist.     NECK: Supple, no palpable adenopathy.    LUNGS: Clear to auscultation bilaterally with no wheezing, rales, or rhonchi. No use of accessory muscle.     HEART: S1 S2 Regular rate and no click/ rub.     ABDOMEN: Soft, nontender, and nondistended.  No hepatosplenomegaly is noted. Active bowel sounds.     EXTREMITIES: Without any cyanosis, clubbing, rash, lesions or edema.    NEUROLOGIC: Awake, alert, oriented.     SKIN: Warm and moist. Non diaphoretic.       LABS:    MICROBIOLOGY: (if applicable)    RADIOLOGY & ADDITIONAL STUDIES:  EKG:   CXR:  ECHO:    IMPRESSION: 61y Female PAST MEDICAL & SURGICAL HISTORY:  Multiple sclerosis    Hypothyroidism    Impression: 62 Y/O Female presented to ED with shortness of breath. Pt says that she was discharged from [facility] yesterday, was admitted for UTI and discharged to [facility] for Rehab. She endorses that she is very sensitive to temperature changes due to underlying MS and yesterday, the air conditioning was not working which caused her to feel short of breathe, fatigued, nauseous and had a headache hence she called 911 and brought all her belongings with the EMR. 08-13-21 Negative for Covid 19 PCR. SOB resolved and saturating good room air on exam.       Suggestion :  O2 saturation 96% room air. So far saturating good room air on exam.   Pulmonary oral hygiene care.  DVT/ GI prophylactic.

## 2021-08-16 NOTE — PROGRESS NOTE ADULT - PROBLEM SELECTOR PLAN 1
Pt called 911 due to feeling short of breath due to faulty air conditioning at [facility] yesterday, says she is sensitive to temperature secondary to MS  -Case management working with patient for safe discharge planning  -Pt takes Dalfampridine ER 10 mg twice daily, Proanthocyanidins 36 mg x once daily  Also takes Tramadol PRN and Tylenol PRN for pain  C/w home medication  Pt has her home medications  F/u PT consult, pt uses a wheelchair to ambulate due to progressive worsening in ambulation 2/2 MS Patient recently discharged from [facility] for a UTI s/p completed abx course, sent to [facility].  UTI symptoms have since resolved, no leukocytosis on admission   Patient requests not to be sent to [facility] for [facility], requesting be sent to [facility] in [place]  CM is following   F/u COVID results

## 2021-08-16 NOTE — PROGRESS NOTE ADULT - PROBLEM SELECTOR PLAN 2
Pt has hx of Levothyroxine 100 mg   C/w home medication Pt called 911 due to feeling short of breath due to faulty air conditioning at [facility] yesterday, says she is sensitive to temperature secondary to MS  -Case management working with patient for safe discharge planning  -Pt takes Dalfampridine ER 10 mg twice daily, Proanthocyanidins 36 mg x once daily  Also takes Tramadol PRN and Tylenol PRN for pain  C/w home medication  Pt has her home medications  PT consult, pt uses a wheelchair to ambulate due to progressive worsening in ambulation 2/2 MS  PT recs [facility]

## 2021-08-16 NOTE — PROGRESS NOTE ADULT - SUBJECTIVE AND OBJECTIVE BOX
PGY-1 Progress Note discussed with attending    PLEASE CONTACT ON CALL TEAM:  - On Call Team (Please refer to Sophie) FROM 5:00 PM - 8:30PM  - Nightfloat Team FROM 8:30 -7:30 AM    CHIEF COMPLAINT & BRIEF HOSPITAL COURSE:  Patient is a 60 y/o Female, ambulates with wheelchair, with medical hx significant for Secondary Progressive Multiple Sclerosis, Hypothyroidism, Major Depression, presenting to the ED due to shortness of breath. Pt says that she was discharged from [facility] yesterday, was admitted for UTI and discharged to [facility] for Rehab. She endorses that she is very sensitive to temperature changes due to underlying MS and yesterday, the air conditioning was not working which caused her to feel short of breathe, fatigued, nauseous and had a headache hence she called 911 and brought all her belongings with the EMR. Pt currently very particular that she would not like to return to [place], would like to be placed elsewhere upon discharge.   Pt has residual weakness bilateral lower extremities ever since she was diagnosed with MS. 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 (13 Aug 2021 11:29)      INTERVAL HPI/OVERNIGHT EVENTS:   No over night events to report. Patient found resting comfortably and has no new complaints. Patient's only request is to not go back to [facility], prefers [facility] in [place]. Patient has no new complaints, denies any change in urinary symptoms s/p completed abx course    REVIEW OF SYSTEMS:  CONSTITUTIONAL: No fever, weight loss, or fatigue  RESPIRATORY: No cough, wheezing, chills or hemoptysis; No shortness of breath  CARDIOVASCULAR: No chest pain, palpitations, dizziness, or leg swelling  GASTROINTESTINAL: No abdominal pain. No nausea, vomiting, or hematemesis; No diarrhea or constipation. No melena or hematochezia.  GENITOURINARY: No dysuria or hematuria, urinary frequency  NEUROLOGICAL: No headaches, memory loss, loss of strength, numbness, or tremors  SKIN: No itching, burning, rashes, or lesions     Vital Signs Last 24 Hrs  T(C): 36.9 (16 Aug 2021 13:29), Max: 36.9 (16 Aug 2021 13:29)  T(F): 98.4 (16 Aug 2021 13:29), Max: 98.4 (16 Aug 2021 13:29)  HR: 64 (16 Aug 2021 14:10) (57 - 64)  BP: 102/68 (16 Aug 2021 14:10) (99/66 - 102/68)  BP(mean): --  RR: 16 (16 Aug 2021 13:29) (16 - 18)  SpO2: 97% (16 Aug 2021 14:10) (95% - 97%)    PHYSICAL EXAMINATION:  GENERAL: NAD, well built  HEAD:  Atraumatic, Normocephalic  EYES:  conjunctiva and sclera clear  NECK: Supple, No JVD, Normal thyroid  CHEST/LUNG: Clear to auscultation. Clear to percussion bilaterally; No rales, rhonchi, wheezing, or rubs  HEART: Regular rate and rhythm; No murmurs, rubs, or gallops  ABDOMEN: Soft, Nontender, Nondistended; Bowel sounds present  NERVOUS SYSTEM:  Alert & Oriented X3,    EXTREMITIES:  2+ Peripheral Pulses, No clubbing, cyanosis, or edema  SKIN: warm dry

## 2021-08-16 NOTE — PROGRESS NOTE ADULT - PROBLEM SELECTOR PLAN 3
Pt has hx of Depression, takes Wellbutrin XL daily  C/w home medication Pt has hx of Levothyroxine 100 mg   C/w home medication

## 2021-08-17 LAB — SARS-COV-2 RNA SPEC QL NAA+PROBE: SIGNIFICANT CHANGE UP

## 2021-08-17 PROCEDURE — 99232 SBSQ HOSP IP/OBS MODERATE 35: CPT | Mod: GC

## 2021-08-17 RX ADMIN — SIMVASTATIN 20 MILLIGRAM(S): 20 TABLET, FILM COATED ORAL at 21:50

## 2021-08-17 RX ADMIN — Medication 20 MILLIGRAM(S): at 21:50

## 2021-08-17 RX ADMIN — Medication 325 MILLIGRAM(S): at 22:21

## 2021-08-17 RX ADMIN — BUPROPION HYDROCHLORIDE 150 MILLIGRAM(S): 150 TABLET, EXTENDED RELEASE ORAL at 12:34

## 2021-08-17 RX ADMIN — Medication 200 MILLIGRAM(S): at 05:22

## 2021-08-17 RX ADMIN — Medication 20 MILLIGRAM(S): at 18:15

## 2021-08-17 RX ADMIN — Medication 325 MILLIGRAM(S): at 21:51

## 2021-08-17 RX ADMIN — Medication 100 MICROGRAM(S): at 05:22

## 2021-08-17 RX ADMIN — Medication 200 MILLIGRAM(S): at 18:13

## 2021-08-17 RX ADMIN — Medication 20 MILLIGRAM(S): at 05:22

## 2021-08-17 RX ADMIN — ENOXAPARIN SODIUM 40 MILLIGRAM(S): 100 INJECTION SUBCUTANEOUS at 12:34

## 2021-08-17 RX ADMIN — FAMOTIDINE 20 MILLIGRAM(S): 10 INJECTION INTRAVENOUS at 05:22

## 2021-08-17 RX ADMIN — Medication 81 MILLIGRAM(S): at 12:34

## 2021-08-17 RX ADMIN — FAMOTIDINE 20 MILLIGRAM(S): 10 INJECTION INTRAVENOUS at 18:13

## 2021-08-17 NOTE — DISCHARGE NOTE PROVIDER - NSDCMRMEDTOKEN_GEN_ALL_CORE_FT
Aspirin Enteric Coated 81 mg oral delayed release tablet: 1 tab(s) orally once a day  baclofen 20 mg oral tablet: 1 tab(s) orally 3 times a day  carBAMazepine 200 mg oral tablet: 1 tab(s) orally 2 times a day  dalfampridine 10 mg oral tablet, extended release: 1 tab(s) orally every 12 hours  famotidine 20 mg oral tablet: 1 tab(s) orally 2 times a day  fluticasone 27.5 mcg/inh nasal spray: 1 spray(s) nasal once a day, As Needed  levothyroxine 100 mcg (0.1 mg) oral tablet: 1 tab(s) orally once a day  simvastatin 20 mg oral tablet: 1 tab(s) orally once a day (at bedtime)  traMADol 150 mg/24 hours oral capsule, extended release: 1 tab(s) orally once a day, As Needed  Tylenol 325 mg oral capsule: 1 cap(s) orally 3 times a day, As Needed  Wellbutrin  mg/24 hours oral tablet, extended release: 1 tab(s) orally every 24 hours  Zofran 4 mg oral tablet: 1 tab(s) orally every 8 hours, As Needed

## 2021-08-17 NOTE — PROGRESS NOTE ADULT - PROBLEM SELECTOR PLAN 2
Pt called 911 due to feeling short of breath due to faulty air conditioning at [facility] yesterday, says she is sensitive to temperature secondary to MS  -Case management working with patient for safe discharge planning  -Pt takes Dalfampridine ER 10 mg twice daily, Proanthocyanidins 36 mg x once daily  Also takes Tramadol PRN and Tylenol PRN for pain  C/w home medication  Pt has her home medications  PT consult, pt uses a wheelchair to ambulate due to progressive worsening in ambulation 2/2 MS  PT recs [facility]ITH[facility]

## 2021-08-17 NOTE — DISCHARGE NOTE PROVIDER - NSDCCPCAREPLAN_GEN_ALL_CORE_FT
PRINCIPAL DISCHARGE DIAGNOSIS  Diagnosis: Multiple sclerosis  Assessment and Plan of Treatment: You came to the hospital because you were feeling short of breath and increased sensitivity to high temperatures. On admission, your vitals and examintaion were unremarkable. Your labwork was normal. We continued your home medications of Dalfampridine ER 10 mg twice daily, Proanthocyanidins 36 mg x once daily. Kindly follow up with your PCP in a week.      SECONDARY DISCHARGE DIAGNOSES  Diagnosis: Hypothyroidism  Assessment and Plan of Treatment: You have history of hypothyroidism. We continued your home medication of levothyroxine.     PRINCIPAL DISCHARGE DIAGNOSIS  Diagnosis: Multiple sclerosis  Assessment and Plan of Treatment: You came to the hospital because you were feeling short of breath and increased sensitivity to high temperatures. On admission, your vitals and examintaion were unremarkable. Your labwork was normal. We continued your home medications of Dalfampridine ER 10 mg twice daily, Proanthocyanidins 36 mg x once daily. You will be discharged to [facility]. Kindly follow up with your primary care physician in a week.      SECONDARY DISCHARGE DIAGNOSES  Diagnosis: Hypothyroidism  Assessment and Plan of Treatment: You have history of hypothyroidism. We continued your home medication of levothyroxine.

## 2021-08-17 NOTE — PROGRESS NOTE ADULT - SUBJECTIVE AND OBJECTIVE BOX
Time of Visit:  Patient seen and examined.     MEDICATIONS  (STANDING):  aspirin enteric coated 81 milliGRAM(s) Oral daily  baclofen 20 milliGRAM(s) Oral three times a day  buPROPion XL (24-Hour) . 150 milliGRAM(s) Oral daily  carBAMazepine ER Tablet 200 milliGRAM(s) Oral two times a day  enoxaparin Injectable 40 milliGRAM(s) SubCutaneous daily  famotidine    Tablet 20 milliGRAM(s) Oral two times a day  levothyroxine 100 MICROGram(s) Oral daily  simvastatin 20 milliGRAM(s) Oral at bedtime  traMADol 50 milliGRAM(s) Oral every 8 hours      MEDICATIONS  (PRN):  acetaminophen   Tablet .. 325 milliGRAM(s) Oral every 8 hours PRN Mild Pain (1 - 3)  ondansetron    Tablet 4 milliGRAM(s) Oral every 8 hours PRN Nausea and/or Vomiting       Medications up to date at time of exam.      PHYSICAL EXAMINATION:  Patient has no new complaints.  GENERAL: The patient is a well-developed, well-nourished, in no apparent distress.     Vital Signs Last 24 Hrs  T(C): 36.3 (17 Aug 2021 12:44), Max: 36.6 (16 Aug 2021 20:41)  T(F): 97.3 (17 Aug 2021 12:44), Max: 97.8 (16 Aug 2021 20:41)  HR: 66 (17 Aug 2021 12:44) (62 - 66)  BP: 115/78 (17 Aug 2021 12:44) (91/65 - 115/78)  BP(mean): --  RR: 16 (17 Aug 2021 12:44) (16 - 18)  SpO2: 96% (17 Aug 2021 12:44) (96% - 97%)   (if applicable)    Chest Tube (if applicable)    HEENT: Head is normocephalic and atraumatic. Extraocular muscles are intact. Mucous membranes are moist.     NECK: Supple, no palpable adenopathy.    LUNGS: Clear to auscultation, no wheezing, rales, or rhonchi.    HEART: Regular rate and rhythm without murmur.    ABDOMEN: Soft, nontender, and nondistended.  No hepatosplenomegaly is noted.    : No painful voiding, no pelvic pain    EXTREMITIES: Without any cyanosis, clubbing, rash, lesions or edema.    NEUROLOGIC: Awake, alert, + b/l lower ext weakness 1/5     SKIN: Warm, dry, good turgor.      LABS:                              MICROBIOLOGY: (if applicable)    RADIOLOGY & ADDITIONAL STUDIES:  EKG:   CXR:  ECHO:    IMPRESSION: 61y Female PAST MEDICAL & SURGICAL HISTORY:  Multiple sclerosis    Hypothyroidism     p/w       Impression: 62 Y/O Female presented to ED with shortness of breath. Pt says that she was discharged from [facility] yesterday, was admitted for UTI and discharged to [facility] for Rehab. She endorses that she is very sensitive to temperature changes due to underlying MS and yesterday, the air conditioning was not working which caused her to feel short of breathe, fatigued, nauseous and had a headache hence she called 911 and brought all her belongings with the EMR. 08-13-21 Negative for Covid 19 PCR. SOB resolved and saturating good room air on exam.       Suggestion :  O2 saturation 96% room air. So far saturating good room air on exam.   Pulmonary oral hygiene care.  DVT/ GI prophylactic.   consider administering #3 covid-19 vacc.. pat is high risk    For long term placement

## 2021-08-17 NOTE — DISCHARGE NOTE PROVIDER - NSDCPNSUBOBJ_GEN_ALL_CORE
Patient is a 61y old  Female who presents with a chief complaint of Shortness of breath (19 Aug 2021 11:35)    Patient was seen and examined at bedside  Denies any new complains    INTERVAL HPI/OVERNIGHT EVENTS:  T(C): 36.8 (08-19-21 @ 12:49), Max: 36.8 (08-19-21 @ 12:49)  HR: 69 (08-19-21 @ 12:49) (57 - 75)  BP: 104/70 (08-19-21 @ 12:49) (96/66 - 104/70)  RR: 16 (08-19-21 @ 12:49) (16 - 17)  SpO2: 95% (08-19-21 @ 12:49) (94% - 95%)  Wt(kg): --  I&O's Summary    18 Aug 2021 07:01  -  19 Aug 2021 07:00  --------------------------------------------------------  IN: 0 mL / OUT: 900 mL / NET: -900 mL    19 Aug 2021 07:01  -  19 Aug 2021 18:28  --------------------------------------------------------  IN: 0 mL / OUT: 700 mL / NET: -700 mL        REVIEW OF SYSTEMS: denies fever, chills, SOB, palpitations, chest pain, abdominal pain, nausea, vomiting, diarrhea, constipation, dizziness    MEDICATIONS  (STANDING):  aspirin enteric coated 81 milliGRAM(s) Oral daily  baclofen 20 milliGRAM(s) Oral three times a day  buPROPion XL (24-Hour) . 150 milliGRAM(s) Oral daily  carBAMazepine ER Tablet 200 milliGRAM(s) Oral two times a day  enoxaparin Injectable 40 milliGRAM(s) SubCutaneous daily  famotidine    Tablet 20 milliGRAM(s) Oral two times a day  levothyroxine 100 MICROGram(s) Oral daily  simvastatin 20 milliGRAM(s) Oral at bedtime    MEDICATIONS  (PRN):  acetaminophen   Tablet .. 325 milliGRAM(s) Oral every 8 hours PRN Mild Pain (1 - 3)  ondansetron    Tablet 4 milliGRAM(s) Oral every 8 hours PRN Nausea and/or Vomiting      PHYSICAL EXAM:  GENERAL: NAD  NERVOUS SYSTEM:  Alert & Oriented X3, Good concentration; Motor Strength 2/5 B/L  lower extremities  CHEST/LUNG: Clear to auscultation bilaterally; No rales, rhonchi, wheezing, or rubs  HEART: Regular rate and rhythm; No murmurs, rubs, or gallops  ABDOMEN: Soft, Nontender, Nondistended; Bowel sounds present  EXTREMITIES:  2+ Peripheral Pulses, No clubbing, cyanosis, or edema  SKIN: No rashes or lesions    A/P:  Advanced MS  Hypothyroidism   Disposition problem    Plan:  Patient continues to take home meds for MS  CM and SW for further disposition

## 2021-08-17 NOTE — PROGRESS NOTE ADULT - PROBLEM SELECTOR PLAN 1
Patient recently discharged from [facility] for a UTI s/p completed abx course, sent to [facility].  UTI symptoms have since resolved, no leukocytosis on admission   Patient requests not to be sent to [facility] for [facility], requesting be sent to [facility] in [place], not availability in   Awaiting [facility] placement, CM attempting placement in [place]  CM is following   (-) COVID results 8/16

## 2021-08-17 NOTE — PROGRESS NOTE ADULT - SUBJECTIVE AND OBJECTIVE BOX
PGY-1 Progress Note discussed with attending    PLEASE CONTACT ON CALL TEAM:  - On Call Team (Please refer to Sophie) FROM 5:00 PM - 8:30PM  - Nightfloat Team FROM 8:30 -7:30 AM    CHIEF COMPLAINT & BRIEF HOSPITAL COURSE:  Patient is a 62 y/o Female, ambulates with wheelchair, with medical hx significant for Secondary Progressive Multiple Sclerosis, Hypothyroidism, Major Depression, presenting to the ED due to shortness of breath. Pt says that she was discharged from [facility] yesterday, was admitted for UTI and discharged to [facility] for Rehab. She endorses that she is very sensitive to temperature changes due to underlying MS and yesterday, the air conditioning was not working which caused her to feel short of breathe, fatigued, nauseous and had a headache hence she called 911 and brought all her belongings with the EMR. Pt currently very particular that she would not like to return to [place], would like to be placed elsewhere upon discharge.   Pt has residual weakness bilateral lower extremities ever since she was diagnosed with MS. 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 (13 Aug 2021 11:29)      INTERVAL HPI/OVERNIGHT EVENTS:   No over night events to report. Patient found resting comfortably and has no new complaints. Patient's only request is to not go back to [facility], prefers [facility] in [place]. Patient has no new complaints, denies any change in urinary symptoms s/p completed abx course.  Patient still pending [facility] assignment.     REVIEW OF SYSTEMS:  CONSTITUTIONAL: No fever, weight loss, or fatigue  RESPIRATORY: No cough, wheezing, chills or hemoptysis; No shortness of breath  CARDIOVASCULAR: No chest pain, palpitations, dizziness, or leg swelling  GASTROINTESTINAL: No abdominal pain. No nausea, vomiting, or hematemesis; No diarrhea or constipation. No melena or hematochezia.  GENITOURINARY: No dysuria or hematuria, urinary frequency  NEUROLOGICAL: No headaches, memory loss, loss of strength, numbness, or tremors  SKIN: No itching, burning, rashes, or lesions     Vital Signs Last 24 Hrs  T(C): 36.9 (16 Aug 2021 13:29), Max: 36.9 (16 Aug 2021 13:29)  T(F): 98.4 (16 Aug 2021 13:29), Max: 98.4 (16 Aug 2021 13:29)  HR: 64 (16 Aug 2021 14:10) (57 - 64)  BP: 102/68 (16 Aug 2021 14:10) (99/66 - 102/68)  BP(mean): --  RR: 16 (16 Aug 2021 13:29) (16 - 18)  SpO2: 97% (16 Aug 2021 14:10) (95% - 97%)    PHYSICAL EXAMINATION:  GENERAL: NAD, well built  HEAD:  Atraumatic, Normocephalic  EYES:  conjunctiva and sclera clear  NECK: Supple, No JVD, Normal thyroid  CHEST/LUNG: Clear to auscultation. Clear to percussion bilaterally; No rales, rhonchi, wheezing, or rubs  HEART: Regular rate and rhythm; No murmurs, rubs, or gallops  ABDOMEN: Soft, Nontender, Nondistended; Bowel sounds present  NERVOUS SYSTEM:  Alert & Oriented X3,    EXTREMITIES:  2+ Peripheral Pulses, No clubbing, cyanosis, or edema  SKIN: warm dry

## 2021-08-17 NOTE — DISCHARGE NOTE PROVIDER - HOSPITAL COURSE
Patient is a 62 y/o Female, ambulates with wheelchair, with medical hx significant for Secondary Progressive Multiple Sclerosis, Hypothyroidism, Major Depression, presenting to the ED due to shortness of breath. Pt says that she was discharged from [facility] yesterday, was admitted for UTI and discharged to [facility] for Rehab. She endorses that she is very sensitive to temperature changes due to underlying MS and yesterday, the air conditioning was not working which caused her to feel short of breathe, fatigued, nauseous and had a headache hence she called 911 and brought all her belongings with the EMR. Pt currently very particular that she would not like to return to [place], would like to be placed elsewhere upon discharge.   Pt has residual weakness bilateral lower extremities ever since she was diagnosed with MS. 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  Patient recently discharged from [facility] for a UTI s/p completed abx course, sent to [facility]. UTI symptoms have since resolved, no leukocytosis on admission   Patient requests not to be sent to [facility] for [facility], requesting be sent to [facility] in [place].     Patient is stable for discharge per attending and is advised to follow up with PCP as outpatient  Please refer to patient's complete medical chart with documents for a full hospital course, for this is only a brief summary. Patient is a 60 y/o Female, ambulates with wheelchair, with medical hx significant for Secondary Progressive Multiple Sclerosis, Hypothyroidism, Major Depression, presenting to the ED due to shortness of breath. Pt says that she was discharged from [facility] yesterday, was admitted for UTI and discharged to [facility] for Rehab. She endorses that she is very sensitive to temperature changes due to underlying MS and yesterday, the air conditioning was not working which caused her to feel short of breathe, fatigued, nauseous and had a headache hence she called 911 and brought all her belongings with the EMR. Pt currently very particular that she would not like to return to [place], would like to be placed elsewhere upon discharge. Pt has residual weakness bilateral lower extremities ever since she was diagnosed with MS. 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 Patient recently discharged from [facility] for a UTI s/p completed abx course, sent to [facility]. UTI symptoms have since resolved, no leukocytosis on admission   Patient requests not to be sent to [facility] for [facility], requesting be sent to [facility] in [place].     Patient is stable for discharge per attending and is advised to follow up with PCP as outpatient  Please refer to patient's complete medical chart with documents for a full hospital course, for this is only a brief summary. 62 y/o female from [facility] rehab with PMHx of secondary progressive multiple sclerosis (follows  and Dr. Carly Espinoza), hypothyroidism, major depression, presented to the ED due to SOB. Patient was discharged to [facility] rehab from [facility] (was treated for a UTI there) one day prior to presenting to [facility]. Patient stated rehab center air conditioner was not working and  endorsed she is very to temperature changes due to underlying MS which provoked her sx of SOB, fatigue, nausea and h/a. Pulmonology Dr. Mcgraw was consulted, her sx resolved with no acute interventions. Patient was continued on her home medications for MS, hypothyroidism, and depression. [facility] and  was able to place her in [facility] for long term placement.     Patient is stable for discharge per attending and is advised to follow up with PCP as outpatient  Please refer to patient's complete medical chart with documents for a full hospital course, for this is only a brief summary.

## 2021-08-18 PROCEDURE — 99232 SBSQ HOSP IP/OBS MODERATE 35: CPT | Mod: GC

## 2021-08-18 RX ADMIN — Medication 81 MILLIGRAM(S): at 11:53

## 2021-08-18 RX ADMIN — Medication 20 MILLIGRAM(S): at 21:37

## 2021-08-18 RX ADMIN — SIMVASTATIN 20 MILLIGRAM(S): 20 TABLET, FILM COATED ORAL at 21:37

## 2021-08-18 RX ADMIN — BUPROPION HYDROCHLORIDE 150 MILLIGRAM(S): 150 TABLET, EXTENDED RELEASE ORAL at 11:53

## 2021-08-18 RX ADMIN — ONDANSETRON 4 MILLIGRAM(S): 8 TABLET, FILM COATED ORAL at 08:06

## 2021-08-18 RX ADMIN — FAMOTIDINE 20 MILLIGRAM(S): 10 INJECTION INTRAVENOUS at 17:12

## 2021-08-18 RX ADMIN — Medication 100 MICROGRAM(S): at 06:14

## 2021-08-18 RX ADMIN — FAMOTIDINE 20 MILLIGRAM(S): 10 INJECTION INTRAVENOUS at 06:15

## 2021-08-18 RX ADMIN — Medication 20 MILLIGRAM(S): at 06:15

## 2021-08-18 RX ADMIN — Medication 20 MILLIGRAM(S): at 13:18

## 2021-08-18 RX ADMIN — Medication 200 MILLIGRAM(S): at 06:15

## 2021-08-18 RX ADMIN — Medication 200 MILLIGRAM(S): at 17:12

## 2021-08-18 RX ADMIN — ENOXAPARIN SODIUM 40 MILLIGRAM(S): 100 INJECTION SUBCUTANEOUS at 11:53

## 2021-08-18 NOTE — DIETITIAN INITIAL EVALUATION ADULT. - PROBLEM SELECTOR PLAN 3
Aortic stenosis  AVR 06/2013  with cabg  CAD (coronary artery disease)  ASHD  MI 2013  CABG X2 & avr -cow valve 06/2013  Diabetes  2  High cholesterol    Hypertension Pt has hx of Depression, takes Wellbutrin XL daily  C/w home medication

## 2021-08-18 NOTE — PROGRESS NOTE ADULT - PROBLEM SELECTOR PLAN 2
Patient recently discharged from [facility] for a UTI s/p completed abx course, sent to [facility].  UTI symptoms have since resolved, no leukocytosis on admission   Patient requests not to be sent to [facility] for [facility], requesting be sent to [facility] in [place], not availability in   Awaiting [facility] placement, CM attempting placement in [place]  CM is following   (-) COVID results 8/16

## 2021-08-18 NOTE — PROGRESS NOTE ADULT - SUBJECTIVE AND OBJECTIVE BOX
PGY-1 Progress Note discussed with attending    PLEASE CONTACT ON CALL TEAM:  - On Call Team (Please refer to Sophie) FROM 5:00 PM - 8:30PM  - Nightfloat Team FROM 8:30 -7:30 AM    CHIEF COMPLAINT & BRIEF HOSPITAL COURSE:  Patient is a 62 y/o Female, ambulates with wheelchair, with medical hx significant for Secondary Progressive Multiple Sclerosis, Hypothyroidism, Major Depression, presenting to the ED due to shortness of breath. Pt says that she was discharged from [facility] yesterday, was admitted for UTI and discharged to [facility] for Rehab. She endorses that she is very sensitive to temperature changes due to underlying MS and yesterday, the air conditioning was not working which caused her to feel short of breathe, fatigued, nauseous and had a headache hence she called 911 and brought all her belongings with the EMR. Pt currently very particular that she would not like to return to [place], would like to be placed elsewhere upon discharge.   Pt has residual weakness bilateral lower extremities ever since she was diagnosed with MS. 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 (13 Aug 2021 11:29)      INTERVAL HPI/OVERNIGHT EVENTS:   No over night events to report. Patient found resting comfortably and has no new complaints. Patient's only request is to not go back to [facility], prefers [facility] in [place]. Patient has no new complaints, denies any change in urinary symptoms s/p completed abx course.  Patient still pending [facility] assignment    REVIEW OF SYSTEMS:  CONSTITUTIONAL: No fever, weight loss, or fatigue  RESPIRATORY: No cough, wheezing, chills or hemoptysis; No shortness of breath  CARDIOVASCULAR: No chest pain, palpitations, dizziness, or leg swelling  GASTROINTESTINAL: No abdominal pain. No nausea, vomiting, or hematemesis; No diarrhea or constipation. No melena or hematochezia.  GENITOURINARY: No dysuria or hematuria, urinary frequency  NEUROLOGICAL: No headaches, memory loss, loss of strength, numbness, or tremors  SKIN: No itching, burning, rashes, or lesions     Vital Signs Last 24 Hrs  T(C): 36.8 (18 Aug 2021 12:40), Max: 36.8 (18 Aug 2021 12:40)  T(F): 98.2 (18 Aug 2021 12:40), Max: 98.2 (18 Aug 2021 12:40)  HR: 65 (18 Aug 2021 12:40) (60 - 67)  BP: 102/67 (18 Aug 2021 12:40) (102/67 - 119/67)  BP(mean): --  ABP: --  ABP(mean): --  RR: 16 (18 Aug 2021 12:40) (16 - 17)  SpO2: 95% (18 Aug 2021 12:40) (95% - 97%)      PHYSICAL EXAMINATION:  GENERAL: NAD, well built  HEAD:  Atraumatic, Normocephalic  EYES:  conjunctiva and sclera clear  NECK: Supple, No JVD, Normal thyroid  CHEST/LUNG: Clear to auscultation. Clear to percussion bilaterally; No rales, rhonchi, wheezing, or rubs  HEART: Regular rate and rhythm; No murmurs, rubs, or gallops  ABDOMEN: Soft, Nontender, Nondistended; Bowel sounds present  NERVOUS SYSTEM:  Alert & Oriented X3,    EXTREMITIES:  2+ Peripheral Pulses, No clubbing, cyanosis, or edema  SKIN: warm dry                           PGY-1 Progress Note discussed with attending    PLEASE CONTACT ON CALL TEAM:  - On Call Team (Please refer to Sophie) FROM 5:00 PM - 8:30PM  - Nightfloat Team FROM 8:30 -7:30 AM    CHIEF COMPLAINT & BRIEF HOSPITAL COURSE:  Patient is a 62 y/o Female, ambulates with wheelchair, with medical hx significant for Secondary Progressive Multiple Sclerosis, Hypothyroidism, Major Depression, presenting to the ED due to shortness of breath. Pt says that she was discharged from [facility] yesterday, was admitted for UTI and discharged to [facility] for Rehab. She endorses that she is very sensitive to temperature changes due to underlying MS and yesterday, the air conditioning was not working which caused her to feel short of breathe, fatigued, nauseous and had a headache hence she called 911 and brought all her belongings with the EMR. Pt currently very particular that she would not like to return to [place], would like to be placed elsewhere upon discharge.   Pt has residual weakness bilateral lower extremities ever since she was diagnosed with MS. 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 (13 Aug 2021 11:29)      INTERVAL HPI/OVERNIGHT EVENTS:   No over night events to report. Patient found resting comfortably and has no new complaints. Patient's only request is to not go back to [facility], prefers [facility] in [place]. Patient has no new complaints, denies any change in urinary symptoms s/p completed abx course.  Patient still pending [facility] assignment, denied from [place] and [facility]. Due to advanced MS presentation, patient no longer qualifies for [facility] but needs Long-term placement.    REVIEW OF SYSTEMS:  CONSTITUTIONAL: No fever, weight loss, or fatigue  RESPIRATORY: No cough, wheezing, chills or hemoptysis; No shortness of breath  CARDIOVASCULAR: No chest pain, palpitations, dizziness, or leg swelling  GASTROINTESTINAL: No abdominal pain. No nausea, vomiting, or hematemesis; No diarrhea or constipation. No melena or hematochezia.  GENITOURINARY: No dysuria or hematuria, urinary frequency  NEUROLOGICAL: No headaches, memory loss, loss of strength, numbness, or tremors  SKIN: No itching, burning, rashes, or lesions     Vital Signs Last 24 Hrs  T(C): 36.8 (18 Aug 2021 12:40), Max: 36.8 (18 Aug 2021 12:40)  T(F): 98.2 (18 Aug 2021 12:40), Max: 98.2 (18 Aug 2021 12:40)  HR: 65 (18 Aug 2021 12:40) (60 - 67)  BP: 102/67 (18 Aug 2021 12:40) (102/67 - 119/67)  BP(mean): --  ABP: --  ABP(mean): --  RR: 16 (18 Aug 2021 12:40) (16 - 17)  SpO2: 95% (18 Aug 2021 12:40) (95% - 97%)      PHYSICAL EXAMINATION:  GENERAL: NAD  HEAD:  Atraumatic, Normocephalic  EYES:  conjunctiva and sclera clear  NECK: Supple, No JVD  CHEST/LUNG: Clear to auscultation; No rales, rhonchi, wheezing, or rubs  HEART: Regular rate and rhythm  ABDOMEN: Soft, Nontender, Nondistended; Bowel sounds present  NERVOUS SYSTEM:  Alert & Oriented X3, no focal deficits in the B/l UE, diminished strength b/l LE.     EXTREMITIES:  2+ Peripheral Pulses, No clubbing, cyanosis, or edema  SKIN: warm dry

## 2021-08-18 NOTE — DIETITIAN INITIAL EVALUATION ADULT. - OTHER INFO
Patient from home with wheel-chair & recently d/jermain from [facility]. Visited pt. alert, reports of varied po intake, "not a big breakfast eater", usually consumes 2 meals, denies chewing/swallowing difficulties, able to fed herself but needs assistance with meal set up since dx. with MS in 2013. PTA pt. complains of 1x day SOB with nausea, denies vomiting or diarrhea, stated lasted weighed at 160 Lbs >2wks ago & stable? dosing wt. 170.8 Lbs on 08/18. Presently pt. consuming ~40-50% of meals & tolerating, provided food preferences, accepted nutrition supplement, encourage po intake, not interested in nutrition education, awaiting placement, skin intact, no edema.

## 2021-08-18 NOTE — DIETITIAN INITIAL EVALUATION ADULT. - PERTINENT MEDS FT
MEDICATIONS  (STANDING):  aspirin enteric coated 81 milliGRAM(s) Oral daily  baclofen 20 milliGRAM(s) Oral three times a day  buPROPion XL (24-Hour) . 150 milliGRAM(s) Oral daily  carBAMazepine ER Tablet 200 milliGRAM(s) Oral two times a day  enoxaparin Injectable 40 milliGRAM(s) SubCutaneous daily  famotidine    Tablet 20 milliGRAM(s) Oral two times a day  levothyroxine 100 MICROGram(s) Oral daily  simvastatin 20 milliGRAM(s) Oral at bedtime    MEDICATIONS  (PRN):  acetaminophen   Tablet .. 325 milliGRAM(s) Oral every 8 hours PRN Mild Pain (1 - 3)  ondansetron    Tablet 4 milliGRAM(s) Oral every 8 hours PRN Nausea and/or Vomiting

## 2021-08-18 NOTE — PROGRESS NOTE ADULT - SUBJECTIVE AND OBJECTIVE BOX
Time of Visit:  Patient seen and examined.     MEDICATIONS  (STANDING):  aspirin enteric coated 81 milliGRAM(s) Oral daily  baclofen 20 milliGRAM(s) Oral three times a day  buPROPion XL (24-Hour) . 150 milliGRAM(s) Oral daily  carBAMazepine ER Tablet 200 milliGRAM(s) Oral two times a day  enoxaparin Injectable 40 milliGRAM(s) SubCutaneous daily  famotidine    Tablet 20 milliGRAM(s) Oral two times a day  levothyroxine 100 MICROGram(s) Oral daily  simvastatin 20 milliGRAM(s) Oral at bedtime      MEDICATIONS  (PRN):  acetaminophen   Tablet .. 325 milliGRAM(s) Oral every 8 hours PRN Mild Pain (1 - 3)  ondansetron    Tablet 4 milliGRAM(s) Oral every 8 hours PRN Nausea and/or Vomiting       Medications up to date at time of exam.      PHYSICAL EXAMINATION:  Patient has no new complaints.  GENERAL: The patient is a well-developed, well-nourished, in no apparent distress.     Vital Signs Last 24 Hrs  T(C): 36.8 (18 Aug 2021 12:40), Max: 36.8 (18 Aug 2021 12:40)  T(F): 98.2 (18 Aug 2021 12:40), Max: 98.2 (18 Aug 2021 12:40)  HR: 65 (18 Aug 2021 12:40) (60 - 67)  BP: 102/67 (18 Aug 2021 12:40) (102/67 - 119/67)  BP(mean): --  RR: 16 (18 Aug 2021 12:40) (16 - 17)  SpO2: 95% (18 Aug 2021 12:40) (95% - 97%)   (if applicable)    Chest Tube (if applicable)    HEENT: Head is normocephalic and atraumatic. Extraocular muscles are intact. Mucous membranes are moist.     NECK: Supple, no palpable adenopathy.    LUNGS: Clear to auscultation, no wheezing, rales, or rhonchi.    HEART: Regular rate and rhythm without murmur.    ABDOMEN: Soft, nontender, and nondistended.  No hepatosplenomegaly is noted.    : No painful voiding, no pelvic pain    EXTREMITIES: Without any cyanosis, clubbing, rash, lesions or edema.    NEUROLOGIC: Awake, alert, oriented,  motor 1/5 b/l LE     SKIN: Warm, dry, good turgor.      LABS:                              MICROBIOLOGY: (if applicable)    RADIOLOGY & ADDITIONAL STUDIES:  EKG:   CXR:  ECHO:    IMPRESSION: 61y Female PAST MEDICAL & SURGICAL HISTORY:  Multiple sclerosis    Hypothyroidism     p/w       Impression: 62 Y/O Female presented to ED with shortness of breath. Pt says that she was discharged from [facility] yesterday, was admitted for UTI and discharged to [facility] for Rehab. She endorses that she is very sensitive to temperature changes due to underlying MS and yesterday, the air conditioning was not working which caused her to feel short of breathe, fatigued, nauseous and had a headache hence she called 911 and brought all her belongings with the EMR. 08-13-21 Negative for Covid 19 PCR. SOB resolved and saturating good room air on exam.       Suggestion :  O2 saturation 96% room air. So far saturating good room air on exam.   Pulmonary oral hygiene care.  DVT/ GI prophylactic.   consider administering #3 covid-19 vacc.. pat is high risk    For long term placement pending auth

## 2021-08-18 NOTE — DIETITIAN INITIAL EVALUATION ADULT. - ADD RECOMMEND
Patient:   TIM DAVIS            MRN: [id number]            FIN: [id number]              Age:   62 years     Sex:  FEMALE     :  [birth year]   Associated Diagnoses:   Facial swelling   Author:   LUPIS RODGERS     Addendum     Teaching-Supervisory Addendum-Brief   I personally performed: supervision of the patient's care.   The case was discussed with: the resident.   Evaluation and management service: I agree with the evaluation and management decisions made in this patient's care.  Notes: this is an addendum to the resident's notes, DR GROVER BUCKLEY, please review his/her notes for the full H&P, medical decision making, re-evaluation, consultation, and final disposition.  Results interpretation.     History of Present Illness   The patient presents with 63 y/o female with hx of htn, hl, thyroid, comes to the ed due to right facial/ right jaw swelling that started around 10 pm last night, assoc with pain. no trauma, no fall, no tooth pain (pt wears denture), no gum swelling, no ear pain, no f/c, no trouble swallowing, no mouth pain, no sore throat, no ha, no neck pain.  no prior episode, denies any travel hx, denies any viral illness or infection, no cp, no  palpitation, no rash, no bleeding disorder, no dental procedure.       Review of Systems   Constitutional symptoms:  No fever, no chills, no weakness, no fatigue.   Skin symptoms:  No rash,    Eye symptoms:  No pain,    ENMT symptoms:  No ear pain, no sore throat, no nasal congestion, no sinus pain.   Respiratory symptoms:  Cough, No shortness of breath,    Cardiovascular symptoms:  No chest pain, no palpitations.    Gastrointestinal symptoms:  No abdominal pain,    Genitourinary symptoms:  No dysuria,    Musculoskeletal symptoms:  No back pain,    Neurologic symptoms:  No headache,    Hematologic/Lymphatic symptoms:  Bleeding tendency negative,    Allergy/immunologic symptoms:  No seasonal allergies,              Additional review of systems  information: All other systems reviewed and otherwise negative.     Physical Examination              Vital Signs   ED Vital Sign   02/06/20 23:20 CST Temperature - VS 36.8 deg_C  Normal    Temperature Source - VS Oral    Heart/Pulse Rate 85  Normal    Pulse Source Monitor    Respiration Rate 16 breaths/min  Normal    SpO2 95 %  Normal    NIBP Systolic 170  HI    NIBP Diastolic 90  Normal    NIBP   HI   .   Height and Weight   02/06/20 23:20 CST CLINICALWEIGHT 85.9 kg    Weight Method Measured    MEDDOSEWT 85.9 kg   .   General:  Alert, responding to questions, talking, not toxic looking.   Skin:  Warm, dry.    Head:  Normocephalic, atraumatic.    Neck:  Supple, trachea midline, right submandibular swelling and tenerness.   Eye:  Pupils are equal, round and reactive to light, extraocular movements are intact, normal conjunctiva.   Ears, nose, mouth and throat:  Tympanic membranes clear, oral mucosa moist, no pharyngeal erythema or exudate, no lesions, ext facial exam normal, right jaw swelling right submandibular swelling, tender, some indurated mass noted concern for mass vs lymph node.  no gum swelling, pt has denture, removed for exam, no gum tenderness, no mastoid tendernss.   Cardiovascular:  Regular rate and rhythm, Normal peripheral perfusion, No edema.   Respiratory:  Lungs are clear to auscultation, Symmetrical chest wall expansion.   Chest wall:  No tenderness.   Back:  Normal range of motion.   Musculoskeletal:  Normal ROM.   Gastrointestinal:  Soft, Nontender.    Genitourinary   Neurological:  Alert and oriented to person, place, time, and situation, No focal neurological deficit observed, normal motor observed, normal speech observed.   Psychiatric:  Cooperative, appropriate mood & affect.      Medical Decision Making   Differential Diagnosis:  pt of above age and history, presents to the ed with sudden onset of right jaw swelling/submandibular swelling that started last night aroudn 10pm, now in  the ed.  concerned about presentation, concerned about any abscess vs parotitis vs salivary gland obstruction vs lymph node swelling vs any other cause, will do labs, imaging and follow up clsoely, please review resident dictation for full impression and plan.   Documents reviewed:  Emergency department nurses' notes, resident documentation.   Results review:  Lab results : LABORATORY   02/07/20 02:40 CST Sodium 139 mmol/L    Potassium 4.2 mmol/L    Chloride 105 mmol/L    Carbon Dioxide (CO2) 28 mmol/L    Anion Gap 10 mmol/L    BUN 18 mg/dL    Creatinine 0.52 mg/dL    BUN/Creatinine Ratio 34  HI    GFR ESTIMATE  >90    GFR ESTIMATE NON  >90    Calcium 8.8 mg/dL    Glucose Level 99 mg/dL    WBC 8.8 THOUSAND/mcL    RBC 4.56 MILLION/mcL    Hemoglobin 14.3 gm/dL    Hematocrit 42.2 %    MCV 92.5 fL    MCH 31.4 pg    MCHC 33.9 gm/dL    RDW-CV 12.7 %    Platelet 233 THOUSAND/mcL    Neutrophils 56 %    Abs Neut 5.0 THOUSAND/mcL    Segs NOT APPLICABLE    Lymph 27 %    Absolute Lymph 2.4 THOUSAND/mcL    Monocytes 12 %    Abs Mono 1.1 THOUSAND/mcL  HI    Eosinophils 3 %    Absolute Eos 0.3 THOUSAND/mcL    Basophils 1 %    Absolute Baso 0.1 THOUSAND/mcL    Analyzer ANC NOT APPLICABLE    Percent Immature Granulocytes 1 %    Absolute Immature Granulocytes 0.0 THOUSAND/mcL    NRBC 0 /100 WBC   .   Radiology results:             Result type: CT MAXILLOFACIAL W CON  Result date: February 07, 2020 05:56 CST  Result status: Auth (Verified)  Result title: CT MAXILLOFACIAL W CON  Performed by: LOW OSPINA on February 07, 2020 06:25 CST  Verified by: LOW OSPINA on February 07, 2020 06:39 CST  Encounter info: [id number], [facility] [place], 02[place]06/20 -   * Final Report *  Reason For Exam  submandibular and jaw swelling, r/o abscess, r/o parotitis, r/o salivary gland swelling/stone  RADRPT  Maxillofacial CT is obtained in axial plane with not listed cc of not listed IV contrast and with  sagittal  and coronal reformations.  History: submandibular and jaw swelling, r/o abscess, r/o parotitis, r/o salivary gland swelling/stone   facial swelling  Comparison: none.  RESULTS:  Visualized intracranial compartment demonstrates grossly normal enhancement.  Dural sinuses are normal.  [place] vasculature is otherwise grossly normal.  Orbits and contents are normal.  Paranasal sinuses are clear.  Mastoid middle ears are clear.  Visualized calvarium, skull base is normal.  Infratemporal fossa,  space on the left is normal.  On the right demonstrates stranding around the right masseter muscle, thickening of the right platysma and inflammatory stranding throughout the neck predominantly subcutaneous but also deep space to the platysma.  There is however no clear  evidence of abscess.  There is no clear evidence of cervical lymphadenopathy.  The parotid glands demonstrate mild edema in the right adjacent which is adjacent to the inflammatory process and platysmal thickening.  This may be secondary.  Similar process in the right masseter muscle, and right submandibular gland.  Nasal cavity, nasopharynx, oral cavity, oropharynx is normal.  Vallecula, and epiglottis are normal.  Prevertebral and retropharyngeal soft tissues normal.  Within the hypopharynx the piriform sinuses are effaced however no clear evidence of underlying mass is noted.  Laryngeal structures are grossly normal.  Carotid arteries bilaterally demonstrate mild atherosclerotic changes right greater than left with irregular narrowing but likely 50-70%  bilaterally.  This may further evaluation with dedicated CTA or MRA.  Jugular venous opacification is normal.  Paravertebral soft tissues normal.  Supraclavicular regions and axillary regions are grossly normal.  Thyroid gland partially imaged unremarkable.  Lungs are imaged.  The cervical spine images mild degenerative changes with spondylosis at the level of C2-C3 resulting in significant  canal stenosis.  Disc space narrowing throughout the cervical spine.  IMPRESSION:   Inflammatory changes in the right neck predominantly subcutaneous also deep space to the platysma without clear evidence of abscess.  Platysmal thickening.  Likely secondary enhancement and edema of the right parotid and submandibular glands as well as right masseter muscle.  Source of the inflammatory process is not clear.  Given the proximity to the parotid gland may represent parotiditis, correlate with clinical exam.  Signature Line  -----  F I N A L  -----  Transcribed By: STEPHANIE  .   Notes:  pt seen with the resident, please review his/her chart for the full H&P and management/disposition notes.  Final discharge to be done by resident (see his/her notes).     Reexamination/ Reevaluation   Vital signs   per nurse's notes   Course: improving.   Pain status: resolved.   Notes: Pt has been observed in the ED, pt in no acute distress.  doing well,  Pt walking, no ataxia, no incoordination  Pt/family member instructed/educated with detailed discussion about  condition, management in the er and disposition, told to fu with pmd as soon  as possible  Pt also told to return if feeling worse or not showing any improvement  Pt/family member/parent comprehend.     Impression and Plan   Diagnosis   Diagnosis   Complaint of Facial swelling (PNED [id number], Reason For Visit, Emergency medicine, Medical)  pt seen with the resident, please review his/her chart for the final diagnosis and disposition  PAROTITIS   Plan   Condition: Stable.    Disposition   Prescriptions: Launch prescriptions   Pharmacy:  traMADol oral 50 mg tablet (Prescribe): 50 mg = 1 tab, Oral, Q6H, PRN for pain, X 5 days, # 20 tab, 0 Refills, Acute  amoxicillin-clavulanate oral 875-125 mg tablet  (Augmentin) (Prescribe): = 1 tab, Oral, Q12H, X 10 days, Tab, # 20 tab, 0 Refills, Acute.   Patient was given the following educational materials:  Lymphadenopathy, Parotitis.   Limitations: Limited activity.    Follow up with: Follow up with primary care provider Within 1 to 3 days Thank you for visiting the emergency department, we hope that you are feeling better.  Please follow up with your primary care physician as soon as possible.  Return to the emergency department if your symptoms gets worse.  Please remember to take your medication if prescribed.   ; Return to Emergency Department Within 24 Hours, only if needed IF FEELING WORSE OR DEVELOPING NEW SYMPTOMS; SIMBA ZAIN, DO Within 3 to 5 days unless otherwise instructed, SIMBA ZAIN, DO Within 3 to 5 days unless otherwise instructed; Return to Emergency Department Within 24 Hours, only if needed IF FEELING WORSE OR DEVELOPING NEW SYMPTOMS; Follow up with primary care provider Within 1 to 3 days Thank you for visiting the emergency  department, we hope that you are feeling better.  Please follow up with your primary care physician as soon as possible.  Return to the emergency department if your symptoms gets worse.  Please remember to take your medication if prescribed.   , Primary Care Physician.    Counseled: Patient, Family, Regarding diagnosis, Regarding diagnostic results, Regarding treatment plan, Patient indicated understanding of instructions.   Add MVI/minerals daily as medically feasible

## 2021-08-18 NOTE — DIETITIAN INITIAL EVALUATION ADULT. - FACTORS AFF FOOD INTAKE
dyspnea, multiple sclerosis, depression, Hypothyroidism, discharge planning issues/difficulty with food procurement/preparation/pain/[employer]/ethnic/cultural/personal food preferences/other (specify)

## 2021-08-18 NOTE — DIETITIAN INITIAL EVALUATION ADULT. - PROBLEM SELECTOR PLAN 1
Pt called 911 due to feeling short of breath due to faulty air conditioning at [facility] yesterday, says she is sensitive to temperature secondary to MS  -Case management working with patient for safe discharge planning  -Pt takes Dalfampridine ER 10 mg twice daily, Proanthocyanidins 36 mg x once daily  Also takes Tramadol PRN and Tylenol PRN for pain  C/w home medication  Pt has her home medications  F/u PT consult, pt uses a wheelchair to ambulate due to progressive worsening in ambulation 2/2 MS

## 2021-08-18 NOTE — PROGRESS NOTE ADULT - PROBLEM SELECTOR PLAN 1
Pt called 911 due to feeling short of breath due to faulty air conditioning at [facility] yesterday, says she is sensitive to temperature secondary to MS  -Case management working with patient for safe discharge planning  -Pt takes Dalfampridine ER 10 mg twice daily, Proanthocyanidins 36 mg x once daily  Also takes Tramadol PRN and Tylenol PRN for pain  C/w home medication  Pt has her home medications  PT consult, pt uses a wheelchair to ambulate due to progressive worsening in ambulation 2/2 MS  PT recs [facility], still pending placement Pt called 911 due to feeling short of breath due to faulty air conditioning at [facility] yesterday, says she is sensitive to temperature secondary to MS  -Case management working with patient for safe discharge planning  -Pt takes Dalfampridine ER 10 mg twice daily, Proanthocyanidins 36 mg x once daily  Also takes Tramadol PRN and Tylenol PRN for pain  C/w home medication  Pt has her home medications  PT consult, pt uses a wheelchair to ambulate due to progressive worsening in ambulation 2/2 MS  PT recs [facility], still pending placement, due to advanced MS patient no longer candidate for [facility] needs long term placement Pt called 911 due to feeling short of breath due to faulty air conditioning at [facility] yesterday, says she is sensitive to temperature secondary to MS  -Case management working with patient for safe discharge planning  -Pt takes Dalfampridine ER 10 mg twice daily,   Also takes Tramadol PRN and Tylenol PRN for pain  C/w home medication  Pt has her home medications  PT consult, pt uses a wheelchair to ambulate due to progressive worsening in ambulation 2/2 MS  PT recs [facility], still pending placement, due to advanced MS patient no longer candidate for [facility] needs long term placement

## 2021-08-18 NOTE — PROGRESS NOTE ADULT - NUTRITIONAL ASSESSMENT
Diet, DASH/TLC:   Sodium & Cholesterol Restricted (08-13-21 @ 11:28) [Active]

## 2021-08-19 PROCEDURE — 99232 SBSQ HOSP IP/OBS MODERATE 35: CPT | Mod: GC

## 2021-08-19 RX ORDER — LANOLIN ALCOHOL/MO/W.PET/CERES
3 CREAM (GRAM) TOPICAL AT BEDTIME
Refills: 0 | Status: COMPLETED | OUTPATIENT
Start: 2021-08-19 | End: 2021-08-19

## 2021-08-19 RX ADMIN — Medication 100 MICROGRAM(S): at 05:35

## 2021-08-19 RX ADMIN — Medication 81 MILLIGRAM(S): at 12:39

## 2021-08-19 RX ADMIN — BUPROPION HYDROCHLORIDE 150 MILLIGRAM(S): 150 TABLET, EXTENDED RELEASE ORAL at 12:39

## 2021-08-19 RX ADMIN — FAMOTIDINE 20 MILLIGRAM(S): 10 INJECTION INTRAVENOUS at 17:19

## 2021-08-19 RX ADMIN — Medication 20 MILLIGRAM(S): at 05:35

## 2021-08-19 RX ADMIN — Medication 20 MILLIGRAM(S): at 15:00

## 2021-08-19 RX ADMIN — Medication 200 MILLIGRAM(S): at 17:19

## 2021-08-19 RX ADMIN — SIMVASTATIN 20 MILLIGRAM(S): 20 TABLET, FILM COATED ORAL at 21:51

## 2021-08-19 RX ADMIN — Medication 200 MILLIGRAM(S): at 05:35

## 2021-08-19 RX ADMIN — Medication 20 MILLIGRAM(S): at 21:51

## 2021-08-19 RX ADMIN — FAMOTIDINE 20 MILLIGRAM(S): 10 INJECTION INTRAVENOUS at 05:35

## 2021-08-19 RX ADMIN — Medication 3 MILLIGRAM(S): at 23:44

## 2021-08-19 RX ADMIN — ENOXAPARIN SODIUM 40 MILLIGRAM(S): 100 INJECTION SUBCUTANEOUS at 12:39

## 2021-08-19 NOTE — PROGRESS NOTE ADULT - SUBJECTIVE AND OBJECTIVE BOX
Time of Visit:  Patient seen and examined.     MEDICATIONS  (STANDING):  aspirin enteric coated 81 milliGRAM(s) Oral daily  baclofen 20 milliGRAM(s) Oral three times a day  buPROPion XL (24-Hour) . 150 milliGRAM(s) Oral daily  carBAMazepine ER Tablet 200 milliGRAM(s) Oral two times a day  enoxaparin Injectable 40 milliGRAM(s) SubCutaneous daily  famotidine    Tablet 20 milliGRAM(s) Oral two times a day  levothyroxine 100 MICROGram(s) Oral daily  simvastatin 20 milliGRAM(s) Oral at bedtime      MEDICATIONS  (PRN):  acetaminophen   Tablet .. 325 milliGRAM(s) Oral every 8 hours PRN Mild Pain (1 - 3)  ondansetron    Tablet 4 milliGRAM(s) Oral every 8 hours PRN Nausea and/or Vomiting       Medications up to date at time of exam.    ROS; No fever, chills, cough, congestion on exam. Denies SOB on exam.   PHYSICAL EXAMINATION:  Vital Signs Last 24 Hrs  T(C): 36.3 (19 Aug 2021 05:40), Max: 36.6 (18 Aug 2021 20:58)  T(F): 97.3 (19 Aug 2021 05:40), Max: 97.9 (18 Aug 2021 20:58)  HR: 57 (19 Aug 2021 05:40) (57 - 75)  BP: 96/66 (19 Aug 2021 05:40) (96/66 - 100/67)  BP(mean): --  RR: 17 (19 Aug 2021 05:40) (16 - 17)  SpO2: 94% (19 Aug 2021 05:40) (94% - 95%)   (if applicable)    General; Alert and oriented. Able to answer question with no SOB. No acute distress.     HEENT: Head is normocephalic and atraumatic. No nasal tenderness. Extraocular muscles are intact. Mucous membranes are moist.     NECK: Supple, no palpable adenopathy.    LUNGS: Clear to auscultation bilaterally with no wheezing, rales, or rhonchi. No use of accessory muscle.     HEART: S1 S2 Regular rate and no click/ rub.     ABDOMEN: Soft, nontender, and nondistended.  Active bowel sounds.     EXTREMITIES: Without any cyanosis, clubbing, rash, lesions or edema.    NEUROLOGIC: Awake, alert, oriented.     SKIN: Warm and moist. Non diaphoretic.       LABS:  MICROBIOLOGY: (if applicable)    RADIOLOGY & ADDITIONAL STUDIES:  EKG:   CXR:  ECHO:    IMPRESSION: 61y Female PAST MEDICAL & SURGICAL HISTORY:  Multiple sclerosis    Hypothyroidism     Impression: 62 Y/O Female presented to ED with shortness of breath. Pt says that she was discharged from [facility] yesterday, was admitted for UTI and discharged to [facility] for Rehab. She endorses that she is very sensitive to temperature changes due to underlying MS and yesterday, the air conditioning was not working which caused her to feel short of breathe, fatigued, nauseous and had a headache hence she called 911 and brought all her belongings with the EMR. 08-13-21 Negative for Covid 19 PCR. SOB resolved and saturating good room air on exam.       Suggestion :  O2 saturation 94% room air. So far saturating good room air on exam.   Pulmonary oral hygiene care.  DVT/ GI prophylactic.   Consider administering #3 covid-19 vacc.. pat is high risk    For long term placement pending authorization.          Time of Visit:  Patient seen and examined.     MEDICATIONS  (STANDING):  aspirin enteric coated 81 milliGRAM(s) Oral daily  baclofen 20 milliGRAM(s) Oral three times a day  buPROPion XL (24-Hour) . 150 milliGRAM(s) Oral daily  carBAMazepine ER Tablet 200 milliGRAM(s) Oral two times a day  enoxaparin Injectable 40 milliGRAM(s) SubCutaneous daily  famotidine    Tablet 20 milliGRAM(s) Oral two times a day  levothyroxine 100 MICROGram(s) Oral daily  simvastatin 20 milliGRAM(s) Oral at bedtime      MEDICATIONS  (PRN):  acetaminophen   Tablet .. 325 milliGRAM(s) Oral every 8 hours PRN Mild Pain (1 - 3)  ondansetron    Tablet 4 milliGRAM(s) Oral every 8 hours PRN Nausea and/or Vomiting       Medications up to date at time of exam.    ROS; No fever, chills, cough, congestion on exam. Denies SOB on exam.   PHYSICAL EXAMINATION:  Vital Signs Last 24 Hrs  T(C): 36.3 (19 Aug 2021 05:40), Max: 36.6 (18 Aug 2021 20:58)  T(F): 97.3 (19 Aug 2021 05:40), Max: 97.9 (18 Aug 2021 20:58)  HR: 57 (19 Aug 2021 05:40) (57 - 75)  BP: 96/66 (19 Aug 2021 05:40) (96/66 - 100/67)  BP(mean): --  RR: 17 (19 Aug 2021 05:40) (16 - 17)  SpO2: 94% (19 Aug 2021 05:40) (94% - 95%)   (if applicable)    General; Alert and oriented. Able to answer question with no SOB. No acute distress.     HEENT: Head is normocephalic and atraumatic. No nasal tenderness. Extraocular muscles are intact. Mucous membranes are moist.     NECK: Supple, no palpable adenopathy.    LUNGS: Clear to auscultation bilaterally with no wheezing, rales, or rhonchi. No use of accessory muscle.     HEART: S1 S2 Regular rate and no click/ rub.     ABDOMEN: Soft, nontender, and nondistended.  Active bowel sounds.     EXTREMITIES: Without any cyanosis, clubbing, rash, lesions or edema.    NEUROLOGIC: Awake, alert, oriented.  b/l lower ext motor 1/5    SKIN: Warm and moist. Non diaphoretic.       LABS:  MICROBIOLOGY: (if applicable)    RADIOLOGY & ADDITIONAL STUDIES:  EKG:   CXR:  ECHO:    IMPRESSION: 61y Female PAST MEDICAL & SURGICAL HISTORY:  Multiple sclerosis    Hypothyroidism     Impression: 62 Y/O Female presented to ED with shortness of breath. Pt says that she was discharged from [facility] yesterday, was admitted for UTI and discharged to [facility] for Rehab. She endorses that she is very sensitive to temperature changes due to underlying MS and yesterday, the air conditioning was not working which caused her to feel short of breathe, fatigued, nauseous and had a headache hence she called 911 and brought all her belongings with the EMR. 08-13-21 Negative for Covid 19 PCR. SOB resolved and saturating good room air on exam.       Suggestion :  O2 saturation 94% room air. So far saturating good room air on exam.   Pulmonary oral hygiene care.  DVT/ GI prophylactic.   Consider administering #3 covid-19 vacc.. pat is high risk    For long term placement pending authorization.      Agree with above assessment and plan as transcribed.

## 2021-08-20 VITALS
DIASTOLIC BLOOD PRESSURE: 71 MMHG | SYSTOLIC BLOOD PRESSURE: 102 MMHG | HEART RATE: 67 BPM | OXYGEN SATURATION: 96 % | RESPIRATION RATE: 16 BRPM | TEMPERATURE: 98 F

## 2021-08-20 PROCEDURE — 99239 HOSP IP/OBS DSCHRG MGMT >30: CPT | Mod: GC

## 2021-08-20 PROCEDURE — 97530 THERAPEUTIC ACTIVITIES: CPT

## 2021-08-20 PROCEDURE — 36415 COLL VENOUS BLD VENIPUNCTURE: CPT

## 2021-08-20 PROCEDURE — 97110 THERAPEUTIC EXERCISES: CPT

## 2021-08-20 PROCEDURE — 87635 SARS-COV-2 COVID-19 AMP PRB: CPT

## 2021-08-20 PROCEDURE — 93005 ELECTROCARDIOGRAM TRACING: CPT

## 2021-08-20 PROCEDURE — 71045 X-RAY EXAM CHEST 1 VIEW: CPT

## 2021-08-20 PROCEDURE — 99285 EMERGENCY DEPT VISIT HI MDM: CPT | Mod: 25

## 2021-08-20 PROCEDURE — 80048 BASIC METABOLIC PNL TOTAL CA: CPT

## 2021-08-20 PROCEDURE — 85025 COMPLETE CBC W/AUTO DIFF WBC: CPT

## 2021-08-20 RX ORDER — DIPHENHYDRAMINE HCL 50 MG
25 CAPSULE ORAL EVERY 6 HOURS
Refills: 0 | Status: DISCONTINUED | OUTPATIENT
Start: 2021-08-20 | End: 2021-08-20

## 2021-08-20 RX ADMIN — BUPROPION HYDROCHLORIDE 150 MILLIGRAM(S): 150 TABLET, EXTENDED RELEASE ORAL at 11:20

## 2021-08-20 RX ADMIN — Medication 25 MILLIGRAM(S): at 09:55

## 2021-08-20 RX ADMIN — Medication 100 MICROGRAM(S): at 05:11

## 2021-08-20 RX ADMIN — Medication 81 MILLIGRAM(S): at 11:20

## 2021-08-20 RX ADMIN — Medication 200 MILLIGRAM(S): at 05:11

## 2021-08-20 RX ADMIN — FAMOTIDINE 20 MILLIGRAM(S): 10 INJECTION INTRAVENOUS at 05:11

## 2021-08-20 RX ADMIN — Medication 325 MILLIGRAM(S): at 09:56

## 2021-08-20 RX ADMIN — Medication 325 MILLIGRAM(S): at 11:05

## 2021-08-20 RX ADMIN — Medication 20 MILLIGRAM(S): at 05:11

## 2021-08-20 NOTE — PROGRESS NOTE ADULT - PROVIDER SPECIALTY LIST ADULT
Internal Medicine
Pulmonology
Pulmonology
Internal Medicine
Pulmonology
Internal Medicine

## 2021-08-20 NOTE — PROGRESS NOTE ADULT - REASON FOR ADMISSION
Shortness of breath

## 2021-08-20 NOTE — PROGRESS NOTE ADULT - SUBJECTIVE AND OBJECTIVE BOX
Patient is a 61y old  Female who presents with a chief complaint of Shortness of breath (19 Aug 2021 11:35)    Patient was seen and examined at bedside   Denies any new complains     INTERVAL HPI/OVERNIGHT EVENTS:  T(C): 36.8 (08-20-21 @ 13:09), Max: 36.8 (08-19-21 @ 20:47)  HR: 67 (08-20-21 @ 13:09) (67 - 72)  BP: 102/71 (08-20-21 @ 13:09) (99/63 - 119/77)  RR: 16 (08-20-21 @ 13:09) (16 - 16)  SpO2: 96% (08-20-21 @ 13:09) (95% - 97%)  Wt(kg): --  I&O's Summary    19 Aug 2021 07:01  -  20 Aug 2021 07:00  --------------------------------------------------------  IN: 0 mL / OUT: 1400 mL / NET: -1400 mL        REVIEW OF SYSTEMS: denies fever, chills, SOB, palpitations, chest pain, abdominal pain, nausea, vomiting, diarrhea, constipation, dizziness    MEDICATIONS  (STANDING):  aspirin enteric coated 81 milliGRAM(s) Oral daily  baclofen 20 milliGRAM(s) Oral three times a day  buPROPion XL (24-Hour) . 150 milliGRAM(s) Oral daily  carBAMazepine ER Tablet 200 milliGRAM(s) Oral two times a day  enoxaparin Injectable 40 milliGRAM(s) SubCutaneous daily  famotidine    Tablet 20 milliGRAM(s) Oral two times a day  levothyroxine 100 MICROGram(s) Oral daily  simvastatin 20 milliGRAM(s) Oral at bedtime    MEDICATIONS  (PRN):  acetaminophen   Tablet .. 325 milliGRAM(s) Oral every 8 hours PRN Mild Pain (1 - 3)  diphenhydrAMINE 25 milliGRAM(s) Oral every 6 hours PRN Allergy symptoms  ondansetron    Tablet 4 milliGRAM(s) Oral every 8 hours PRN Nausea and/or Vomiting      PHYSICAL EXAM:  GENERAL: NAD, well built  HEAD:  Atraumatic, Normocephalic  EYES:  conjunctiva and sclera clear  NECK: Supple, No JVD, Normal thyroid  CHEST/LUNG: Clear to auscultation.No rales, rhonchi, wheezing, or rubs  HEART: Regular rate and rhythm; No murmurs, rubs, or gallops  ABDOMEN: Soft, Nontender, Nondistended; Bowel sounds present  NERVOUS SYSTEM:  Alert & Oriented X3, 2/5 muscle strength in BL LE  EXTREMITIES:  2+ Peripheral Pulses, No clubbing, cyanosis, or edema  SKIN: warm dry      LABS:              CAPILLARY BLOOD GLUCOSE

## 2021-08-20 NOTE — DISCHARGE NOTE NURSING/CASE MANAGEMENT/SOCIAL WORK - PATIENT PORTAL LINK FT
You can access the FollowMyHealth Patient Portal offered by [facility] by registering at the following website: [email or web address]. By joining [facility] FollowMyHealth portal, you will also be able to view your health information using other applications (apps) compatible with our system.

## 2021-08-20 NOTE — DISCHARGE NOTE NURSING/CASE MANAGEMENT/SOCIAL WORK - NSDCPEFALRISK_GEN_ALL_CORE
For information on Fall & injury Prevention, visit [email or web address]

## 2021-09-21 NOTE — PROGRESS NOTE ADULT - ASSESSMENT
62 y/o Female, ambulates with wheelchair, with medical hx significant for secondary to Progressive Multiple Sclerosis, Hypothyroidism, Major Depression, presenting to the ED due to shortness of breath. Pt says that she was discharged from [facility] yesterday, was admitted for UTI and discharged to [facility] for Rehab. Patient essentially stayed at [facility] for 1 day and called 911 as she was feeling warm and AC conditioning not working causing her shortness of breath. Patient comfortable NAD, CXR clear. Patient reports that she do not want to go back to [facility] and would like to discuss other options for rehab. Patient reports no dysuria, has already completed course of antibiotics for UTI.  Patient admitted for safe discharge planning.    Progressive Multiple Sclerosis  Hypothyroidism.- resume home dose  Major Depression- resumed home meds  Discharge planning issues.    Plan: PT eval recommending [facility], patient do not want to go back to [facility]. Case management on board for [facility] placement.    
60 y/o Female, ambulates with wheelchair, with medical hx significant for secondary to Progressive Multiple Sclerosis, Hypothyroidism, Major Depression, presenting to the ED due to shortness of breath. Pt says that she was discharged from [facility] yesterday, was admitted for UTI and discharged to [facility] for Rehab. Patient essentially stayed at [facility] for 1 day and called 911 as she was feeling warm and AC conditioning not working causing her shortness of breath. Patient comfortable NAD, CXR clear. Patient reports that she do not want to go back to [facility] and would like to discuss other options for rehab. Patient reports no dysuria, has already completed course of antibiotics for UTI.  Patient admitted for safe discharge planning.    Progressive Multiple Sclerosis  Hypothyroidism.- resume home dose  Major Depression- resumed home meds  Discharge planning issues.    Plan:PT cynthia ortegaing [facility]. Case management on board    
62 y/o Female, ambulates with wheelchair, with medical hx significant for Secondary Progressive Multiple Sclerosis, Hypothyroidism, Major Depression, presenting to the ED due to shortness of breath which has resolved upon hospital admission, admitted for Safe discharge.
62 y/o Female, ambulates with wheelchair, with medical hx significant for Secondary Progressive Multiple Sclerosis, Hypothyroidism, Major Depression, presenting to the ED due to shortness of breath which has resolved upon hospital admission, admitted for Safe discharge.
60 y/o Female, ambulates with wheelchair, with medical hx significant for Secondary Progressive Multiple Sclerosis, Hypothyroidism, Major Depression, presenting to the ED due to shortness of breath which has resolved upon hospital admission, admitted for Safe discharge.
- - -

## 2022-02-24 NOTE — PROGRESS NOTE ADULT - ATTENDING COMMENTS
intermittent
Patient was seen and examined at bedside today  Emotionally overwhelmed about the disposition plan    AAOx3, no deficit in UE, muscle strength 1/5 in BL LE     Will cont home meds for MS  Patient waiting disposition to [facility]  Pulm consult appreciated
Patient was seen and examined at bedside now  Denies any new complains     P/E: Lungs CTABL  AAOx3, BL LE strength 2/5  rest of the exam as above      Plan: Patient continues to take Dalfampridine, follows [facility] MS clinic   CM and SW on board for disposition
Patient was seen and examined at bedside today  Denies any complains     AAOx3, no deficit in UE, muscle strength 1/5 in BL LE     Will cont home meds for MS  Patient waiting disposition to [facility]  Pulm consult appreciated